# Patient Record
Sex: MALE | Race: WHITE | Employment: FULL TIME | URBAN - METROPOLITAN AREA
[De-identification: names, ages, dates, MRNs, and addresses within clinical notes are randomized per-mention and may not be internally consistent; named-entity substitution may affect disease eponyms.]

---

## 2018-09-27 ENCOUNTER — HOSPITAL ENCOUNTER (EMERGENCY)
Facility: HOSPITAL | Age: 28
Discharge: HOME | End: 2018-09-27
Attending: EMERGENCY MEDICINE
Payer: OTHER MISCELLANEOUS

## 2018-09-27 VITALS
HEIGHT: 74 IN | OXYGEN SATURATION: 100 % | RESPIRATION RATE: 18 BRPM | DIASTOLIC BLOOD PRESSURE: 80 MMHG | HEART RATE: 76 BPM | SYSTOLIC BLOOD PRESSURE: 151 MMHG | BODY MASS INDEX: 25.67 KG/M2 | TEMPERATURE: 98 F | WEIGHT: 200 LBS

## 2018-09-27 DIAGNOSIS — S39.012A STRAIN OF LUMBAR REGION, INITIAL ENCOUNTER: Primary | ICD-10-CM

## 2018-09-27 PROCEDURE — 99283 EMERGENCY DEPT VISIT LOW MDM: CPT

## 2018-09-27 RX ORDER — HYDROCODONE BITARTRATE AND ACETAMINOPHEN 5; 325 MG/1; MG/1
1 TABLET ORAL ONCE
Status: COMPLETED | OUTPATIENT
Start: 2018-09-27 | End: 2018-09-27

## 2018-09-27 RX ORDER — HYDROCODONE BITARTRATE AND ACETAMINOPHEN 5; 325 MG/1; MG/1
1-2 TABLET ORAL EVERY 4 HOURS PRN
Qty: 10 TABLET | Refills: 0 | Status: SHIPPED | OUTPATIENT
Start: 2018-09-27 | End: 2018-10-04

## 2018-09-27 NOTE — ED PROVIDER NOTES
Patient Seen in: [facility] Emergency Department    History   Patient presents with:  Back Pain (musculoskeletal)    Stated Complaint:     HPI    Patient is a 41-year-old male who presents with low back pain started last night after lifting someth tyesha        ED Course   Labs Reviewed - No data to display    MDM   Discussed with patient that he likely pulled the muscle and recommend pain medicine and local therapy as well as exercises for home. Provided with work note and pain meds.   Patient's fa

## 2018-09-27 NOTE — ED INITIAL ASSESSMENT (HPI)
Pt states he injured lower back yesterday lifting heavy items at work. States pain worsened with movement. Ambulatory in triage.

## 2024-08-16 ENCOUNTER — HOSPITAL ENCOUNTER (OUTPATIENT)
Age: 34
Discharge: HOME | End: 2024-08-16
Payer: MEDICAID

## 2024-08-16 ENCOUNTER — TELEPHONE (OUTPATIENT)
Dept: FAMILY MEDICINE CLINIC | Facility: CLINIC | Age: 34
End: 2024-08-16

## 2024-08-16 VITALS
TEMPERATURE: 98 F | SYSTOLIC BLOOD PRESSURE: 123 MMHG | DIASTOLIC BLOOD PRESSURE: 83 MMHG | HEART RATE: 72 BPM | OXYGEN SATURATION: 96 % | RESPIRATION RATE: 18 BRPM

## 2024-08-16 DIAGNOSIS — M54.40 BACK PAIN OF LUMBAR REGION WITH SCIATICA: Primary | ICD-10-CM

## 2024-08-16 RX ORDER — CYCLOBENZAPRINE HCL 10 MG
10 TABLET ORAL 3 TIMES DAILY PRN
Qty: 20 TABLET | Refills: 0 | Status: SHIPPED | OUTPATIENT
Start: 2024-08-16 | End: 2024-08-19

## 2024-08-16 RX ORDER — PREDNISONE 20 MG/1
40 TABLET ORAL DAILY
Qty: 10 TABLET | Refills: 0 | Status: SHIPPED | OUTPATIENT
Start: 2024-08-16 | End: 2024-08-21

## 2024-08-16 NOTE — DISCHARGE INSTRUCTIONS
You likely have sciatica and muscle strain of your back.    I sent you a prescription for prednisone to help with the inflammation and pain.  Use the Flexeril for severe pain, remember this will make you drowsy so do not drive or drink alcohol when you take it.    Use lidocaine patches as discussed for pain control, you can buy them over-the-counter at the 4% strength.    If you develop any numbness, tingling, acutely worsening pain, urinary or bowel incontinence or any other concerning complaints you should go to the emergency department.  If you have persistent pain for more than the next week make an appointment to see the physical medicine specialist.  Otherwise follow-up with your primary care doctor.

## 2024-08-16 NOTE — TELEPHONE ENCOUNTER
Reason for Call/Symptoms: Lower back pain that radiates down left leg    Onset: End of 7/2024    Courtesy Assessment: Patient called states he was going to sit in his car and started to having muscle spasms of his lower back and left leg and fell out of his car and had a lot of nerve pain. He thinks he may have bugled disc of his lower back. He is in pain now --> 7/10, improved since onset when he was 10/10. He is able to walk, but shorter distances and has been limping. He has had some right hand tingling since onset. Denies any dragging of foot or numbness of foot. He did not go to Emergency Department or Immediate Care for the incident as he did not have any medical coverage. He was advised to go to [facility] today--> agreeable to [facility]. He also scheduled a new patient appointment with Dr. Jo Monday. Home Care Advice discussed, per protocol. Patient instructed any new or worsening symptoms [reviewed] seek immediate medical attention--> Emergency Department/911. Patient verbalized understanding. No further questions or concerns at this time.    Level of care recommendation: Immediate Care    Advice given to patient: Immediate Care advised with new patient visit post Immediate Care. Home Care Advice discussed, per protocol. [Below]    USE A COLD PACK FOR PAIN:  * Put a cold pack or an ice bag (wrapped in a moist towel) on the area for 20 minutes.  * Repeat in 1 hour, then every 4 hours while awake.  * Continue this for the first 48 hours (2 days).  * This will help decrease pain.  * Caution: Avoid frostbite.    12: Back Pain From Lifting or Twisting:   USE HEAT AFTER 48 HOURS FOR PAIN:  * If pain lasts over 48 hours (2 days), put heat on the sore area.  * Use a heat pack, heating pad, or warm wet washcloth.  * Do this for 10 minutes three times a day.  * This will help increase blood flow and improve healing.  * Caution: Avoid burn. Do not sleep on a heating pad.   Back Pain From  Lifting or Twisting:   SLEEP:  * Sleep on your side with a pillow between your knees.  * If you sleep on your back, place a pillow under your knees.  * Avoid sleeping on the abdomen. The mattress should be firm or reinforced with a board.  * Avoid waterbeds.   Back Pain From Lifting or Twisting:   ACTIVITY:  * Continue normal activities as much as your pain allows. Staying active is more healing for the back than rest.  * Avoid any activities that increase the pain a lot.  * Avoid heavy lifting and strenuous exercise until completely well.  * Complete bed rest is not needed and can make you feel more stiff.   Back Pain From Lifting or Twisting:   PAIN MEDICINES:  * For pain relief, you can take either acetaminophen, ibuprofen, or naproxen.  * They are over-the-counter (OTC) pain drugs. You can buy them at the drugstore.  * ACETAMINOPHEN - REGULAR STRENGTH TYLENOL: Take 650 mg (two 325 mg pills) by mouth every 4 to 6 hours as needed. Each Regular Strength Tylenol pill has 325 mg of acetaminophen. The most you should take is 10 pills a day (3,250 mg total). Note: In [place], the maximum is 12 pills a day (3,900 mg total).  * ACETAMINOPHEN - EXTRA STRENGTH TYLENOL: Take 1,000 mg (two 500 mg pills) every 6 to 8 hours as needed. Each Extra Strength Tylenol pill has 500 mg of acetaminophen. The most you should take is 6 pills a day (3,000 mg total). Note: In [place], the maximum is 8 pills a day (4,000 mg total).  * IBUPROFEN (E.G., MOTRIN, ADVIL): Take 400 mg (two 200 mg pills) by mouth every 6 hours. The most you should take is 6 pills a day (1,200 mg total).  * NAPROXEN (E.G., ALEVE): Take 220 mg (one 220 mg pill) by mouth every 8 to 12 hours as needed. You may take 440 mg (two 220 mg pills) for your first dose. The most you should take is 3 pills a day (660 mg total). Note: In [place], the maximum is 2 pills a day (one every 12 hours; 440 mg total).  * Use the lowest amount of medicine that makes your pain better.      Future Appointments   Date Time Provider Department Center   8/19/2024  2:00 PM Reggie Jo MD [facility]

## 2024-08-16 NOTE — ED INITIAL ASSESSMENT (HPI)
Pt c/o pain from left lower back that radiates to his left leg for couple of weeks, denies injury

## 2024-08-17 NOTE — ED PROVIDER NOTES
Patient Seen in: [facility]      History     Chief Complaint   Patient presents with    Pain     Stated Complaint: BACK PAIN  Subjective:   Sanket is a 33-year-old male presenting to the immediate care complaining of back pain.  Patient states that the pain has been going on for the past couple of weeks.  States that there was no acute change in the pain today but he finally had time to come in for evaluation.  States that he does have some radiation of the pain down his left leg.  Patient does have a history of sciatica and this feels similar to previous sciatica symptoms.  Patient denies any weakness, numbness, tingling to his legs.  No saddle anesthesia.  No urinary or bowel incontinence.  Patient denies any fall, injury or trauma.  Denies any urinary symptoms.  Denies any abdominal pain.  Patient has no other complaints or concerns.        Objective:   No pertinent past medical history.          No pertinent past surgical history.            No pertinent social history.          Review of Systems    Positive for stated complaint: Pain    Other systems are as noted in HPI.  Constitutional and vital signs reviewed.      All other systems reviewed and negative except as noted above.    Physical Exam     ED Triage Vitals [08/16/24 1813]   /83   Pulse 72   Resp 18   Temp 97.8 °F (36.6 °C)   Temp src Temporal   SpO2 96 %   O2 Device None (Room air)     Current:/83   Pulse 72   Temp 97.8 °F (36.6 °C) (Temporal)   Resp 18   SpO2 96%     Physical Exam  Vitals and nursing note reviewed.   Constitutional:       General: He is not in acute distress.     Appearance: Normal appearance. He is not ill-appearing, toxic-appearing or diaphoretic.   HENT:      Head: Normocephalic.   Cardiovascular:      Rate and Rhythm: Normal rate.   Pulmonary:      Effort: Pulmonary effort is normal.   Musculoskeletal:         General: Normal range of motion.      Cervical back: Normal and normal range of motion.       Thoracic back: Normal.      Lumbar back: Normal.      Comments: Cervical, thoracic, lumbar and sacral spine were palpated and were without any tenderness, crepitus or step-off.  Patient has paraspinal pain to the left lower lumbar area.  Patient has positive left straight leg raise. 5/5 strength with flexion and extension of bilateral lower extremities.        Skin:     General: Skin is warm and dry.      Capillary Refill: Capillary refill takes less than 2 seconds.   Neurological:      General: No focal deficit present.      Mental Status: He is alert and oriented to person, place, and time.   Psychiatric:         Mood and Affect: Mood normal.         Behavior: Behavior normal.         Thought Content: Thought content normal.         Judgment: Judgment normal.         ED Course   Radiology:  No results found.  Labs Reviewed - No data to display    MDM     Medical Decision Making  Differential diagnoses reflecting the complexity of care include but are not limited to sciatica, muscle strain, less likely acute pain problem.    Comorbidities that add complexity to management include: None  History obtained by an independent source was from: Patient  Patient is well appearing, non-toxic and in no acute distress.  Vital signs are stable.   There is no bony tenderness on spine exam so there is minimal utility in x-rays.  History and physical exam are consistent with muscle strain/sciatica.  Sent a prescription for prednisone for sciatica pain.  Sent prescription for Flexeril for severe pain with drowsiness precautions. Recommended lidocaine patches for pain control. Recommended if patient develops any numbness, tingling, acutely worsening pain, urinary or bowel incontinence or any other concerning complaints they should go to the emergency department.  Recommended if patient has persistent pain for more than the next week they make an appointment to see the physical medicine specialist.  Otherwise recommended follow-up  with primary care doctor.  ED precautions discussed.  Patient (guardian) advised to follow up with PCP in 2-3 days.  Patient (guardian) agrees with this plan of care.  Patient (guardian) verbalizes understanding of discharge instructions and plan of care.      Risk  OTC drugs.  Prescription drug management.        Disposition and Plan     Clinical Impression:  1. Back pain of lumbar region with sciatica         Disposition:  Discharge  8/16/2024  6:50 pm    Follow-up:  Jayro Ge MD  [address] [place] [id number] [phone]          Andreas Cardoza MD  [address] [place] [id number]  [phone]                Medications Prescribed:  Discharge Medication List as of 8/16/2024  6:56 PM        START taking these medications    Details   predniSONE 20 MG Oral Tab Take 2 tablets (40 mg total) by mouth daily for 5 days., Normal, Disp-10 tablet, R-0      cyclobenzaprine 10 MG Oral Tab Take 1 tablet (10 mg total) by mouth 3 (three) times daily as needed for Muscle spasms., Normal, Disp-20 tablet, R-0

## 2024-08-19 ENCOUNTER — OFFICE VISIT (OUTPATIENT)
Facility: LOCATION | Age: 34
End: 2024-08-19

## 2024-08-19 VITALS
SYSTOLIC BLOOD PRESSURE: 122 MMHG | HEIGHT: 74 IN | HEART RATE: 87 BPM | DIASTOLIC BLOOD PRESSURE: 79 MMHG | OXYGEN SATURATION: 97 % | TEMPERATURE: 98 F | BODY MASS INDEX: 26 KG/M2 | RESPIRATION RATE: 18 BRPM

## 2024-08-19 DIAGNOSIS — M48.062 NEUROGENIC CLAUDICATION DUE TO LUMBAR SPINAL STENOSIS: Primary | ICD-10-CM

## 2024-08-19 PROCEDURE — 99204 OFFICE O/P NEW MOD 45 MIN: CPT | Performed by: INTERNAL MEDICINE

## 2024-08-19 RX ORDER — PREDNISONE 10 MG/1
TABLET ORAL
Qty: 18 TABLET | Refills: 0 | Status: SHIPPED | OUTPATIENT
Start: 2024-08-19 | End: 2024-08-28

## 2024-08-19 RX ORDER — CYCLOBENZAPRINE HCL 10 MG
TABLET ORAL 3 TIMES DAILY PRN
Qty: 90 TABLET | Refills: 3 | Status: SHIPPED | OUTPATIENT
Start: 2024-08-19

## 2024-08-19 NOTE — PROGRESS NOTES
INTERNAL MEDICINE OFFICE NOTE     Patient ID: Sanket Sweeney is a 33 year old male.  Today's Date: 08/19/24  Chief Complaint: Low Back Pain (Lower back and left leg for about 2 weeks. Pain at about a 7)    Low Back Pain      Pleasant 31-year-old gentleman who presents for back pain.  He states this been going on for about 2 weeks, no obvious injury but he does have repetitive activities as.  Does have a history of this starting in the past.  States that it starts in his left side and radiates down his back.  Went to urgent care, notes reviewed and appreciated, he was given some prednisone and muscle relaxer and he has noted some improvement.  He has not noticed any resolution.        Vitals:    08/19/24 1407   BP: 122/79   Pulse: 87   Resp: 18   Temp: 98.1 °F (36.7 °C)   SpO2: 97%   Height: 6' 2\" (1.88 m)     body mass index is 25.68 kg/m².  BP Readings from Last 3 Encounters:   08/19/24 122/79   08/16/24 123/83   09/27/18 151/80     The ASCVD Risk score (Maureen LANGSTON, et al., 2019) failed to calculate for the following reasons:    The 2019 ASCVD risk score is only valid for ages 40 to 79  Medications reviewed:  Current Outpatient Medications   Medication Sig Dispense Refill    predniSONE 10 MG Oral Tab Take 3 tablets (30 mg total) by mouth daily for 3 days, THEN 2 tablets (20 mg total) daily for 3 days, THEN 1 tablet (10 mg total) daily for 3 days. TAKE WITH FOOD IF BACK PAIN WORSENS.. 18 tablet 0    cyclobenzaprine 10 MG Oral Tab Take 0.5-1 tablets (5-10 mg total) by mouth 3 (three) times daily as needed for Muscle spasms. May cause sedation; no driving. 90 tablet 3    predniSONE 20 MG Oral Tab Take 2 tablets (40 mg total) by mouth daily for 5 days. 10 tablet 0    NASACORT AQ 55 MCG/ACT NA AERS 2 SPRAY EACH NOSTRIL BID FOR 1 WEEK AND THEN QD 1 11    FLONASE 50 MCG/ACT NA SUSP 2 sprays each nostril BID x 1week, then qd 1 11         Assessment & Plan    1. Neurogenic claudication due to lumbar spinal  stenosis (Primary)  -     predniSONE; Take 3 tablets (30 mg total) by mouth daily for 3 days, THEN 2 tablets (20 mg total) daily for 3 days, THEN 1 tablet (10 mg total) daily for 3 days. TAKE WITH FOOD IF BACK PAIN WORSENS..  Dispense: 18 tablet; Refill: 0  -     Cyclobenzaprine HCl; Take 0.5-1 tablets (5-10 mg total) by mouth 3 (three) times daily as needed for Muscle spasms. May cause sedation; no driving.  Dispense: 90 tablet; Refill: 3  -     PHYSICAL THERAPY - INTERNAL  -     MRI SPINE LUMBAR (CPT=72148); Future; Expected date: 08/19/2024  -     Physiatry Referral - In Network  Plan  Given the radicular symptoms we will give him a tapering dose of prednisone, continue Flexeril, advise no chiropractic manipulation, I like him to do some heat and massage physical therapy and I would like to get an MRI of the lumbar spine given the recurrent nature of his symptoms, I suspect there may be some foraminal stenosis going on.    Follow Up: Return in about 4 weeks (around 9/16/2024) for ANNUAL EXAM..         Objective: Results:   Physical Exam  Vitals and nursing note reviewed.   Constitutional:       General: He is not in acute distress.     Appearance: Normal appearance.   HENT:      Head: Normocephalic.      Right Ear: External ear normal.      Left Ear: External ear normal.   Eyes:      Extraocular Movements: Extraocular movements intact.      Conjunctiva/sclera: Conjunctivae normal.   Pulmonary:      Effort: Pulmonary effort is normal.   Musculoskeletal:      Cervical back: Normal range of motion and neck supple.      Lumbar back: Spasms and tenderness present. Decreased range of motion. Positive left straight leg raise test.   Skin:     Coloration: Skin is not jaundiced.   Neurological:      General: No focal deficit present.      Mental Status: He is alert and oriented to person, place, and time. Mental status is at baseline.   Psychiatric:         Mood and Affect: Mood normal.         Behavior: Behavior normal.         Reviewed:    There are no problems to display for this patient.     No Known Allergies     Social History     Socioeconomic History    Marital status: Single   Tobacco Use    Smoking status: Every Day     Types: Cigarettes    Smokeless tobacco: Never   Social History Narrative    ** Merged History Encounter **           Review of Systems  All other systems negative unless otherwise stated in ROS or HPI above.       Reggie Jo MD  Internal Medicine       Call office with any questions or seek emergency care if necessary.   Patient understands and agrees to follow directions and advice.      ----------------------------------------- PATIENT INSTRUCTIONS-----------------------------------------   .    Patient Instructions         Your symptoms are consistent with lower back pain and/ or muscle spasm.   This should resolve on it's own with time (can take 6-8 weeks) but below are some recommendations to speed your recovery:   Take 1-2 tablets of naproxen, which is the same as Aleve, twice daily with food for 5-7 days to reduce inflammation and pain. Do not use these medications if you have a kidney condition. You may take an over the counter proton-pump-inhibitor such as Nexium or the generic \"omeprazole\" (ask your pharmacist for alternative and assistance) as directed with naproxen/Aleve if you have acid reflux/heartburn or develop an upset stomach.   If you have been prescribed a muscle relaxant such as flexeril, take as directed to help reduce muscle spasms and pain. The maximum dose is 10mg however any dose of this medication can make you extremely drowsy, no driving.    You may use exercises and stretches as discussed below, or check out the [employer]: [employer].  Use heat, stretches, and therapies as outline below.   You can use over the counter medications like lidocaine patches, Salonpas, BenGay, IcyHot as directed.  Please do not see a chiropractor for spine  manipulation (ie traction) as this can result in permanent damage to your blood vessels or nerves. Acupuncture and massage therapy are fine however.   If you fail to improve or worsen despite performing the above for 2-3 weeks, please return to the office for re-evaluation. We can discuss further imaging and evaluation by our specialists.    Back Pain (Acute or Chronic)  3  Back pain is one of the most common problems. The good news is that most people feel better in 1 to 2 weeks, and most of the rest in 1 to 2 months. Most people can remain active.  People who have pain describe it differently--not everyone is the same.  The pain can be sharp, stabbing, shooting, aching, cramping or burning.  Movement, standing, bending, lifting, sitting, or walking may worsen pain.  It can be localized to one spot or area, or it can be more generalized.  It can spread or radiate upwards, to the front, or go down your arms or legs (sciatica).  It can cause muscle spasm.  Most of the time, mechanical problems with the muscles or spine cause the pain. Mechanical problems are usually caused by an injury to the muscles or ligaments. While illness can cause back pain, it is usually not caused by a serious illness. Mechanical problems include:   Physical activity such as sports, exercise, work, or normal activity  Overexertion, lifting, pushing, pulling incorrectly or too aggressively  Sudden twisting, bending, or stretching from an accident, or accidental movement  Poor posture  Stretching or moving wrong, without noticing pain at the time  Poor coordination, lack of regular exercise (check with your doctor about this)  Spinal disc disease or arthritis  Stress  Pain can also be related to pregnancy, or illness like appendicitis, bladder or kidney infections, pelvic infections, and many other things.  Acute back pain usually gets better in 1 to 2 weeks. Back pain related to disk disease, arthritis in the spinal joints or spinal stenosis  (narrowing of the spinal canal) can become chronic and last for months or years.  Unless you had a physical injury (for example, a car accident or fall) X-rays are usually not needed for the initial evaluation of back pain. If pain continues and does not respond to medical treatment, X-rays and other tests may be needed.  Home care  Try these home care recommendations:  When in bed, try to find a position of comfort. A firm mattress is best. Try lying flat on your back with pillows under your knees. You can also try lying on your side with your knees bent up towards your chest and a pillow between your knees.  At first, do not try to stretch out the sore spots. If there is a strain, it is not like the good soreness you get after exercising without an injury. In this case, stretching may make it worse.  Don't sit for long periods, as in a long car ride or during other travel. This puts more stress on the lower back than standing or walking.  During the first 24 to 72 hours after an acute injury or flare up of chronic back pain, apply an ice pack to the painful area for 20 minutes and then remove it for 20 minutes. Do this over a period of 60 to 90 minutes or several times a day. This will reduce swelling and pain. Wrap the ice pack in a thin towel or plastic to protect your skin.  You can start with ice, then switch to heat. Heat (hot shower, hot bath, or heating pad) reduces pain and works well for muscle spasms. Heat can be applied to the painful area for 20 minutes then remove it for 20 minutes. Do this over a period of 60 to 90 minutes or several times a day. Do not sleep on a heating pad. It can lead to skin burns or tissue damage.  You can alternate ice and heat therapy. Talk with your doctor about the best treatment for your back pain.  Therapeutic massage can help relax the back muscles without stretching them.  Be aware of safe lifting methods and do not lift anything without stretching  first.  Medicines  Talk to your doctor before using medicine, especially if you have other medical problems or are taking other medicines.  You may use over-the-counter medicine as directed on the bottle to control pain, unless another pain medicine was prescribed. If you have chronic conditions like diabetes, liver or kidney disease, stomach ulcers, or gastrointestinal bleeding, or are taking blood thinners, talk to your doctor before taking any medicine.  Be careful if you are given a prescription medicines, narcotics, or medicine for muscle spasms. They can cause drowsiness, affect your coordination, reflexes, and judgement. Do not drive or operate heavy machinery.  Follow-up care  Follow up with your healthcare provider, or as advised.   A radiologist will review any X-rays that were taken. Your provide will notify you of any new findings that may affect your care.  Call 911  Call 911 if any of the following occur:  Trouble breathing  Confusion  Very drowsy or trouble awakening  Fainting or loss of consciousness  Rapid or very slow heart rate  Loss of bowel or bladder control  When to seek medical advice  Call your healthcare provider right away if any of these occur:   Pain becomes worse or spreads to your legs  Weakness or numbness in one or both legs  Numbness in the groin or genital area  Date Last Reviewed: 7/1/2016  © 2094-6114 [employer]. [address] [place] [address], [place] [address]. All rights reserved. This information is not intended as a substitute for professional medical care. Always follow your healthcare professional's instructions.    Self-Care for Low Back Pain    Most people have low back pain now and then. In many cases, it isn’t serious and self-care can help. Sometimes low back pain can be a sign of a bigger problem. Call your healthcare provider if your pain returns often or gets worse over time. For the long-term care of your back, get regular exercise, lose any excess weight  and learn good posture.  Take a short rest  Lying down during the day may be helpful for short periods of time if severe pain increases with sitting or standing. Long-term bed rest could be damaging.  Reduce pain and swelling  Cold reduces swelling. Both cold and heat can reduce pain. Protect your skin by placing a towel between your body and the ice or heat source.  For the first few days, apply an ice pack for 15 to 20 minutes, several times a day. To make a cold pack, put ice cubes in a plastic bag that seals at the top.  After the first few days, try heat for 15 minutes at a time to ease pain. Never sleep on a heating pad.  Over-the-counter medicine can help control pain and swelling. Try aspirin or a non-steroidal anti-inflammatory medicine (NSAID) such as ibuprofen.  Exercise  Exercise can help your back heal. It also helps your back get stronger and more flexible, preventing any reinjury. Ask your healthcare provider about specific exercises for your back.  Use good posture to avoid reinjury  When moving, bend at the hips and knees. Don’t bend at the waist or twist around.  When lifting, keep the object close to your body. Lift heavy items using your legs, not your back. Don’t try to lift more than you can handle.  When sitting, keep your lower back supported. Use a rolled-up towel as needed.     Seek medical care right away if:  You can't stand or walk.  You have a temperature over 100.4°F (38.0°C)  You have frequent, painful, or bloody urination.  You have severe abdominal pain.  You have a sharp, stabbing pain.  Your pain is constant.  You have pain, tingling, or numbness in your leg.  You feel pain in a new area of your back.  You notice that the pain isn’t decreasing after more than a week.  Date Last Reviewed: 3/1/2018  © 4411-6306 [employer]. [address] [facility], [place] [address]. All rights reserved. This information is not intended as a substitute for professional medical care.  Always follow your healthcare professional's instructions.    Relieving Back Pain  Back pain is a common problem. You can strain back muscles by lifting too much weight or just by moving the wrong way. Back strain can be uncomfortable, even painful. And it can take weeks or months to improve. To help yourself feel better and prevent future back strains, try these tips.  Important: Don't give aspirin to children or teens without first discussing it with your child's healthcare provider.  Ice    Ice reduces muscle pain and swelling. It helps most during the first 24 to 48 hours after an injury.  Wrap an ice pack or a bag of frozen peas in a thin towel. Never put ice directly on your skin.  Place the ice where your back hurts the most.  Don’t ice for more than 20 minutes at a time.  You can use ice several times a day.  Medicines  Over-the-counter pain relievers include acetaminophen and anti-inflammatory medicines, which includes aspirin, naproxen, or ibuprofen. They can help ease discomfort. Some also reduce swelling.  Tell your healthcare provider about any medicines you are already taking.  Take medicines only as directed.  Manipulation and massage  Having manipulation by an osteopathic doctor or chiropractor may be helpful. Getting a massage also may help.   Heat  After the first 48 hours, heat can relax sore muscles and improve blood flow.  Try a warm bath or shower. Or use a heating pad set on low. To prevent a burn, keep a cloth between you and the heating pad.  Don’t use a heating pad for more than 15 minutes at a time. Never sleep on a heating pad.  Date Last Reviewed: 6/1/2018  © 5755-9264 [employer]. [address] [place] [address], [place] [address]. All rights reserved. This information is not intended as a substitute for professional medical care. Always follow your healthcare professional's instructions.    Back Spasm (No Trauma)  Spasm of the back muscles can occur after a sudden forceful  twisting or bending such as in a car accident. A spasm can also happen after a simple awkward movement, or after lifting something heavy with poor body positioning. In any case, muscle spasm adds to the pain. Sleeping in an awkward position or on a poor quality mattress can also cause this. Some people respond to emotional stress by tensing the muscles of their back.  Pain that continues may need further assessment or other types of treatment such as physical therapy.  You don't always need X-rays for the first assessment of back pain, unless you had a physical injury such as from a car accident or fall. If your pain continues and doesn't respond to medical treatment, X-rays and other tests may then be done.   Home care  As soon as possible, start sitting or walking again. This will help prevent problems from a long bed rest. These problems include muscle weakness, worsening back stiffness and pain, and blood clots in the legs.  When in bed, try to find a position of comfort. A firm mattress is best. Try lying flat on your back with pillows under your knees. You can also try lying on your side with your knees bent up toward your chest and a pillow between your knees.  Don't sit for long periods. Also limit car rides and travel. This puts more stress on the lower back than standing or walking.   During the first 24 to 72 hours after an injury or flare-up, put an ice pack on the painful area for 20 minutes, then remove it for 20 minutes. Do this over a period of 60 to 90 minutes, or several times a day. This will reduce swelling and pain. Always wrap ice packs in a thin towel.  You can start with ice, then switch to heat. Heat from a hot shower, hot bath, or heating pad reduces pain and works well for muscle spasms. Put heat on the painful area for 20 minutes, then remove it for 20 minutes. Do this over a period of 60 to 90 minutes, or several times a day. Don't sleep on a heating pad. It can burn or damage  skin.  Alternate using ice and heat.  Be aware of safe lifting methods. don't lift anything over 15 pounds until all the pain is gone.  Gentle stretching will help your back heal faster. Do this simple routine 2 to 3 times a day until your back is feeling better.  Lie on your back with your knees bent and both feet on the ground.  Slowly raise your left knee to your chest as you flatten your lower back against the floor. Hold for 20 to 30 seconds.  Relax and repeat the exercise with your right knee.  Do 2 to 3 of these exercises for each leg.  Repeat, hugging both knees to your chest at the same time.  Don't bounce, but use a gentle pull.  Medicines  Talk with your doctor before using medicine, especially if you have other medical problems or are taking other medicines.  You may use over-the-counter medicines such as acetaminophen, ibuprofen, or naprosyn to control pain, unless your healthcare provider prescribed another pain medicine. Talk with your healthcare provider if you have a chronic condition such as diabetes, liver or kidney disease, stomach ulcer, or digestive bleeding, or are taking blood thinners.  Be careful if you are given prescription pain medicine, opioids, or medicine for muscle spasm. They can cause drowsiness, and affect your coordination, reflexes, and judgment. Don't drive or operate heavy machinery when taking these medicines. Take pain medicine only as prescribed by your healthcare provider.  Follow-up care  Follow up with your doctor, or as advised. You may need physical therapy or more tests.  If X-rays were taken, they may be reviewed by a radiologist. You will be told of any new findings that may affect your care.  Call   Call if any of these occur:  Trouble breathing  Confusion  Drowsiness or trouble awakening  Fainting or loss of consciousness  Rapid or very slow heart rate  Loss of bowel or bladder control  When to seek medical advice  Call your healthcare provider right away if any  of these occur:  Pain becomes worse or spreads to your legs  Weakness or numbness in one or both legs  Numbness in the groin or genital area  Fever of 100.4ºF (38ºC) or higher , or as directed by your healthcare provider  Chills  Burning or pain when passing urine  Date Last Reviewed: 11/1/2018  © 9031-8470 [employer]. [address] [place] [address]. All rights reserved. This information is not intended as a substitute for professional medical care. Always follow your healthcare professional's instructions.

## 2024-08-19 NOTE — PATIENT INSTRUCTIONS
Your symptoms are consistent with lower back pain and/ or muscle spasm.   This should resolve on it's own with time (can take 6-8 weeks) but below are some recommendations to speed your recovery:   Take 1-2 tablets of naproxen, which is the same as Aleve, twice daily with food for 5-7 days to reduce inflammation and pain. Do not use these medications if you have a kidney condition. You may take an over the counter proton-pump-inhibitor such as Nexium or the generic \"omeprazole\" (ask your pharmacist for alternative and assistance) as directed with naproxen/Aleve if you have acid reflux/heartburn or develop an upset stomach.   If you have been prescribed a muscle relaxant such as flexeril, take as directed to help reduce muscle spasms and pain. The maximum dose is 10mg however any dose of this medication can make you extremely drowsy, no driving.    You may use exercises and stretches as discussed below, or check out the [employer]: [employer].  Use heat, stretches, and therapies as outline below.   You can use over the counter medications like lidocaine patches, Salonpas, BenGay, IcyHot as directed.  Please do not see a chiropractor for spine manipulation (ie traction) as this can result in permanent damage to your blood vessels or nerves. Acupuncture and massage therapy are fine however.   If you fail to improve or worsen despite performing the above for 2-3 weeks, please return to the office for re-evaluation. We can discuss further imaging and evaluation by our specialists.    Back Pain (Acute or Chronic)  3  Back pain is one of the most common problems. The good news is that most people feel better in 1 to 2 weeks, and most of the rest in 1 to 2 months. Most people can remain active.  People who have pain describe it differently--not everyone is the same.  The pain can be sharp, stabbing, shooting, aching, cramping or burning.  Movement, standing, bending, lifting, sitting,  or walking may worsen pain.  It can be localized to one spot or area, or it can be more generalized.  It can spread or radiate upwards, to the front, or go down your arms or legs (sciatica).  It can cause muscle spasm.  Most of the time, mechanical problems with the muscles or spine cause the pain. Mechanical problems are usually caused by an injury to the muscles or ligaments. While illness can cause back pain, it is usually not caused by a serious illness. Mechanical problems include:   Physical activity such as sports, exercise, work, or normal activity  Overexertion, lifting, pushing, pulling incorrectly or too aggressively  Sudden twisting, bending, or stretching from an accident, or accidental movement  Poor posture  Stretching or moving wrong, without noticing pain at the time  Poor coordination, lack of regular exercise (check with your doctor about this)  Spinal disc disease or arthritis  Stress  Pain can also be related to pregnancy, or illness like appendicitis, bladder or kidney infections, pelvic infections, and many other things.  Acute back pain usually gets better in 1 to 2 weeks. Back pain related to disk disease, arthritis in the spinal joints or spinal stenosis (narrowing of the spinal canal) can become chronic and last for months or years.  Unless you had a physical injury (for example, a car accident or fall) X-rays are usually not needed for the initial evaluation of back pain. If pain continues and does not respond to medical treatment, X-rays and other tests may be needed.  Home care  Try these home care recommendations:  When in bed, try to find a position of comfort. A firm mattress is best. Try lying flat on your back with pillows under your knees. You can also try lying on your side with your knees bent up towards your chest and a pillow between your knees.  At first, do not try to stretch out the sore spots. If there is a strain, it is not like the good soreness you get after exercising  without an injury. In this case, stretching may make it worse.  Don't sit for long periods, as in a long car ride or during other travel. This puts more stress on the lower back than standing or walking.  During the first 24 to 72 hours after an acute injury or flare up of chronic back pain, apply an ice pack to the painful area for 20 minutes and then remove it for 20 minutes. Do this over a period of 60 to 90 minutes or several times a day. This will reduce swelling and pain. Wrap the ice pack in a thin towel or plastic to protect your skin.  You can start with ice, then switch to heat. Heat (hot shower, hot bath, or heating pad) reduces pain and works well for muscle spasms. Heat can be applied to the painful area for 20 minutes then remove it for 20 minutes. Do this over a period of 60 to 90 minutes or several times a day. Do not sleep on a heating pad. It can lead to skin burns or tissue damage.  You can alternate ice and heat therapy. Talk with your doctor about the best treatment for your back pain.  Therapeutic massage can help relax the back muscles without stretching them.  Be aware of safe lifting methods and do not lift anything without stretching first.  Medicines  Talk to your doctor before using medicine, especially if you have other medical problems or are taking other medicines.  You may use over-the-counter medicine as directed on the bottle to control pain, unless another pain medicine was prescribed. If you have chronic conditions like diabetes, liver or kidney disease, stomach ulcers, or gastrointestinal bleeding, or are taking blood thinners, talk to your doctor before taking any medicine.  Be careful if you are given a prescription medicines, narcotics, or medicine for muscle spasms. They can cause drowsiness, affect your coordination, reflexes, and judgement. Do not drive or operate heavy machinery.  Follow-up care  Follow up with your healthcare provider, or as advised.   A radiologist will  review any X-rays that were taken. Your provide will notify you of any new findings that may affect your care.  Call 911  Call 911 if any of the following occur:  Trouble breathing  Confusion  Very drowsy or trouble awakening  Fainting or loss of consciousness  Rapid or very slow heart rate  Loss of bowel or bladder control  When to seek medical advice  Call your healthcare provider right away if any of these occur:   Pain becomes worse or spreads to your legs  Weakness or numbness in one or both legs  Numbness in the groin or genital area  Date Last Reviewed: 7/1/2016 © 2000-2019 [employer]. [address] [place] [id number]. All rights reserved. This information is not intended as a substitute for professional medical care. Always follow your healthcare professional's instructions.    Self-Care for Low Back Pain    Most people have low back pain now and then. In many cases, it isn’t serious and self-care can help. Sometimes low back pain can be a sign of a bigger problem. Call your healthcare provider if your pain returns often or gets worse over time. For the long-term care of your back, get regular exercise, lose any excess weight and learn good posture.  Take a short rest  Lying down during the day may be helpful for short periods of time if severe pain increases with sitting or standing. Long-term bed rest could be damaging.  Reduce pain and swelling  Cold reduces swelling. Both cold and heat can reduce pain. Protect your skin by placing a towel between your body and the ice or heat source.  For the first few days, apply an ice pack for 15 to 20 minutes, several times a day. To make a cold pack, put ice cubes in a plastic bag that seals at the top.  After the first few days, try heat for 15 minutes at a time to ease pain. Never sleep on a heating pad.  Over-the-counter medicine can help control pain and swelling. Try aspirin or a non-steroidal anti-inflammatory medicine (NSAID) such as  ibuprofen.  Exercise  Exercise can help your back heal. It also helps your back get stronger and more flexible, preventing any reinjury. Ask your healthcare provider about specific exercises for your back.  Use good posture to avoid reinjury  When moving, bend at the hips and knees. Don’t bend at the waist or twist around.  When lifting, keep the object close to your body. Lift heavy items using your legs, not your back. Don’t try to lift more than you can handle.  When sitting, keep your lower back supported. Use a rolled-up towel as needed.     Seek medical care right away if:  You can't stand or walk.  You have a temperature over 100.4°F (38.0°C)  You have frequent, painful, or bloody urination.  You have severe abdominal pain.  You have a sharp, stabbing pain.  Your pain is constant.  You have pain, tingling, or numbness in your leg.  You feel pain in a new area of your back.  You notice that the pain isn’t decreasing after more than a week.  Date Last Reviewed: 3/1/2018  © 9100-2293 [employer]. [address] [place] [address]. All rights reserved. This information is not intended as a substitute for professional medical care. Always follow your healthcare professional's instructions.    Relieving Back Pain  Back pain is a common problem. You can strain back muscles by lifting too much weight or just by moving the wrong way. Back strain can be uncomfortable, even painful. And it can take weeks or months to improve. To help yourself feel better and prevent future back strains, try these tips.  Important: Don't give aspirin to children or teens without first discussing it with your child's healthcare provider.  Ice    Ice reduces muscle pain and swelling. It helps most during the first 24 to 48 hours after an injury.  Wrap an ice pack or a bag of frozen peas in a thin towel. Never put ice directly on your skin.  Place the ice where your back hurts the most.  Don’t ice for more than 20  minutes at a time.  You can use ice several times a day.  Medicines  Over-the-counter pain relievers include acetaminophen and anti-inflammatory medicines, which includes aspirin, naproxen, or ibuprofen. They can help ease discomfort. Some also reduce swelling.  Tell your healthcare provider about any medicines you are already taking.  Take medicines only as directed.  Manipulation and massage  Having manipulation by an osteopathic doctor or chiropractor may be helpful. Getting a massage also may help.   Heat  After the first 48 hours, heat can relax sore muscles and improve blood flow.  Try a warm bath or shower. Or use a heating pad set on low. To prevent a burn, keep a cloth between you and the heating pad.  Don’t use a heating pad for more than 15 minutes at a time. Never sleep on a heating pad.  Date Last Reviewed: 6/1/2018  © 8372-9344 [employer]. [address] [place] [address]. All rights reserved. This information is not intended as a substitute for professional medical care. Always follow your healthcare professional's instructions.    Back Spasm (No Trauma)  Spasm of the back muscles can occur after a sudden forceful twisting or bending such as in a car accident. A spasm can also happen after a simple awkward movement, or after lifting something heavy with poor body positioning. In any case, muscle spasm adds to the pain. Sleeping in an awkward position or on a poor quality mattress can also cause this. Some people respond to emotional stress by tensing the muscles of their back.  Pain that continues may need further assessment or other types of treatment such as physical therapy.  You don't always need X-rays for the first assessment of back pain, unless you had a physical injury such as from a car accident or fall. If your pain continues and doesn't respond to medical treatment, X-rays and other tests may then be done.   Home care  As soon as possible, start sitting or walking  again. This will help prevent problems from a long bed rest. These problems include muscle weakness, worsening back stiffness and pain, and blood clots in the legs.  When in bed, try to find a position of comfort. A firm mattress is best. Try lying flat on your back with pillows under your knees. You can also try lying on your side with your knees bent up toward your chest and a pillow between your knees.  Don't sit for long periods. Also limit car rides and travel. This puts more stress on the lower back than standing or walking.   During the first 24 to 72 hours after an injury or flare-up, put an ice pack on the painful area for 20 minutes, then remove it for 20 minutes. Do this over a period of 60 to 90 minutes, or several times a day. This will reduce swelling and pain. Always wrap ice packs in a thin towel.  You can start with ice, then switch to heat. Heat from a hot shower, hot bath, or heating pad reduces pain and works well for muscle spasms. Put heat on the painful area for 20 minutes, then remove it for 20 minutes. Do this over a period of 60 to 90 minutes, or several times a day. Don't sleep on a heating pad. It can burn or damage skin.  Alternate using ice and heat.  Be aware of safe lifting methods. don't lift anything over 15 pounds until all the pain is gone.  Gentle stretching will help your back heal faster. Do this simple routine 2 to 3 times a day until your back is feeling better.  Lie on your back with your knees bent and both feet on the ground.  Slowly raise your left knee to your chest as you flatten your lower back against the floor. Hold for 20 to 30 seconds.  Relax and repeat the exercise with your right knee.  Do 2 to 3 of these exercises for each leg.  Repeat, hugging both knees to your chest at the same time.  Don't bounce, but use a gentle pull.  Medicines  Talk with your doctor before using medicine, especially if you have other medical problems or are taking other medicines.  You  may use over-the-counter medicines such as acetaminophen, ibuprofen, or naprosyn to control pain, unless your healthcare provider prescribed another pain medicine. Talk with your healthcare provider if you have a chronic condition such as diabetes, liver or kidney disease, stomach ulcer, or digestive bleeding, or are taking blood thinners.  Be careful if you are given prescription pain medicine, opioids, or medicine for muscle spasm. They can cause drowsiness, and affect your coordination, reflexes, and judgment. Don't drive or operate heavy machinery when taking these medicines. Take pain medicine only as prescribed by your healthcare provider.  Follow-up care  Follow up with your doctor, or as advised. You may need physical therapy or more tests.  If X-rays were taken, they may be reviewed by a radiologist. You will be told of any new findings that may affect your care.  Call   Call if any of these occur:  Trouble breathing  Confusion  Drowsiness or trouble awakening  Fainting or loss of consciousness  Rapid or very slow heart rate  Loss of bowel or bladder control  When to seek medical advice  Call your healthcare provider right away if any of these occur:  Pain becomes worse or spreads to your legs  Weakness or numbness in one or both legs  Numbness in the groin or genital area  Fever of 100.4ºF (38ºC) or higher , or as directed by your healthcare provider  Chills  Burning or pain when passing urine  Date Last Reviewed: 11/1/2018  © 6450-4353 [employer]. [address] [place] [id number]. All rights reserved. This information is not intended as a substitute for professional medical care. Always follow your healthcare professional's instructions.

## 2024-09-09 ENCOUNTER — LAB ENCOUNTER (OUTPATIENT)
Dept: LAB | Age: 34
End: 2024-09-09
Attending: INTERNAL MEDICINE
Payer: MEDICAID

## 2024-09-09 ENCOUNTER — OFFICE VISIT (OUTPATIENT)
Facility: LOCATION | Age: 34
End: 2024-09-09

## 2024-09-09 VITALS
OXYGEN SATURATION: 97 % | BODY MASS INDEX: 28.85 KG/M2 | SYSTOLIC BLOOD PRESSURE: 131 MMHG | DIASTOLIC BLOOD PRESSURE: 80 MMHG | HEART RATE: 93 BPM | WEIGHT: 224.81 LBS | HEIGHT: 74 IN

## 2024-09-09 DIAGNOSIS — Z13.228 SCREENING FOR ENDOCRINE, NUTRITIONAL, METABOLIC AND IMMUNITY DISORDER: ICD-10-CM

## 2024-09-09 DIAGNOSIS — E55.9 VITAMIN D DEFICIENCY: ICD-10-CM

## 2024-09-09 DIAGNOSIS — Z11.3 SCREENING FOR STD (SEXUALLY TRANSMITTED DISEASE): ICD-10-CM

## 2024-09-09 DIAGNOSIS — Z13.21 SCREENING FOR ENDOCRINE, NUTRITIONAL, METABOLIC AND IMMUNITY DISORDER: ICD-10-CM

## 2024-09-09 DIAGNOSIS — Z00.00 ANNUAL PHYSICAL EXAM: Primary | ICD-10-CM

## 2024-09-09 DIAGNOSIS — Z13.29 SCREENING FOR ENDOCRINE, NUTRITIONAL, METABOLIC AND IMMUNITY DISORDER: ICD-10-CM

## 2024-09-09 DIAGNOSIS — Z13.0 SCREENING FOR ENDOCRINE, NUTRITIONAL, METABOLIC AND IMMUNITY DISORDER: ICD-10-CM

## 2024-09-09 LAB
ALBUMIN SERPL-MCNC: 4.7 G/DL (ref 3.2–4.8)
ALBUMIN/GLOB SERPL: 1.6 {RATIO} (ref 1–2)
ALP LIVER SERPL-CCNC: 52 U/L
ALT SERPL-CCNC: 114 U/L
ANION GAP SERPL CALC-SCNC: 6 MMOL/L (ref 0–18)
AST SERPL-CCNC: 52 U/L (ref ?–34)
BILIRUB SERPL-MCNC: 1.2 MG/DL (ref 0.3–1.2)
BUN BLD-MCNC: 8 MG/DL (ref 9–23)
BUN/CREAT SERPL: 10.1 (ref 10–20)
CALCIUM BLD-MCNC: 9.9 MG/DL (ref 8.7–10.4)
CHLORIDE SERPL-SCNC: 107 MMOL/L (ref 98–112)
CHOLEST SERPL-MCNC: 193 MG/DL (ref ?–200)
CO2 SERPL-SCNC: 27 MMOL/L (ref 21–32)
CREAT BLD-MCNC: 0.79 MG/DL
DEPRECATED RDW RBC AUTO: 38.5 FL (ref 35.1–46.3)
EGFRCR SERPLBLD CKD-EPI 2021: 120 ML/MIN/1.73M2 (ref 60–?)
ERYTHROCYTE [DISTWIDTH] IN BLOOD BY AUTOMATED COUNT: 11.8 % (ref 11–15)
EST. AVERAGE GLUCOSE BLD GHB EST-MCNC: 100 MG/DL (ref 68–126)
GLOBULIN PLAS-MCNC: 3 G/DL (ref 2–3.5)
GLUCOSE BLD-MCNC: 88 MG/DL (ref 70–99)
HBA1C MFR BLD: 5.1 % (ref ?–5.7)
HCT VFR BLD AUTO: 45.7 %
HDLC SERPL-MCNC: 72 MG/DL (ref 40–59)
HGB BLD-MCNC: 15.8 G/DL
LDLC SERPL CALC-MCNC: 109 MG/DL (ref ?–100)
MCH RBC QN AUTO: 31 PG (ref 26–34)
MCHC RBC AUTO-ENTMCNC: 34.6 G/DL (ref 31–37)
MCV RBC AUTO: 89.6 FL
NONHDLC SERPL-MCNC: 121 MG/DL (ref ?–130)
OSMOLALITY SERPL CALC.SUM OF ELEC: 288 MOSM/KG (ref 275–295)
PLATELET # BLD AUTO: 291 10(3)UL (ref 150–450)
POTASSIUM SERPL-SCNC: 4.3 MMOL/L (ref 3.5–5.1)
PROT SERPL-MCNC: 7.7 G/DL (ref 5.7–8.2)
RBC # BLD AUTO: 5.1 X10(6)UL
SODIUM SERPL-SCNC: 140 MMOL/L (ref 136–145)
TRIGL SERPL-MCNC: 67 MG/DL (ref 30–149)
TSI SER-ACNC: 0.77 MIU/ML (ref 0.55–4.78)
VIT D+METAB SERPL-MCNC: 13.2 NG/ML (ref 30–100)
VLDLC SERPL CALC-MCNC: 11 MG/DL (ref 0–30)
WBC # BLD AUTO: 6.8 X10(3) UL (ref 4–11)

## 2024-09-09 PROCEDURE — 84443 ASSAY THYROID STIM HORMONE: CPT | Performed by: INTERNAL MEDICINE

## 2024-09-09 PROCEDURE — 82306 VITAMIN D 25 HYDROXY: CPT | Performed by: INTERNAL MEDICINE

## 2024-09-09 PROCEDURE — 87491 CHLMYD TRACH DNA AMP PROBE: CPT

## 2024-09-09 PROCEDURE — 36415 COLL VENOUS BLD VENIPUNCTURE: CPT | Performed by: INTERNAL MEDICINE

## 2024-09-09 PROCEDURE — 80053 COMPREHEN METABOLIC PANEL: CPT | Performed by: INTERNAL MEDICINE

## 2024-09-09 PROCEDURE — 80061 LIPID PANEL: CPT | Performed by: INTERNAL MEDICINE

## 2024-09-09 PROCEDURE — 83036 HEMOGLOBIN GLYCOSYLATED A1C: CPT | Performed by: INTERNAL MEDICINE

## 2024-09-09 PROCEDURE — 87591 N.GONORRHOEAE DNA AMP PROB: CPT

## 2024-09-09 PROCEDURE — 85027 COMPLETE CBC AUTOMATED: CPT | Performed by: INTERNAL MEDICINE

## 2024-09-09 RX ORDER — PREDNISONE 10 MG/1
TABLET ORAL
Qty: 18 TABLET | Refills: 0 | Status: SHIPPED | OUTPATIENT
Start: 2024-09-09 | End: 2024-09-17

## 2024-09-09 NOTE — PROGRESS NOTES
INTERNAL MEDICINE ANNUAL EXAM NOTE     Patient ID: Sanket Sweeney is a 34 year old male.  Chief Complaint: Physical and Back Pain      Sanket Sweeney is a pleasant 34 year old male who presents for annual physical exam. Sanket Sweeney is doing well today.  MRI scheduled, feeling a little better.     Health Maintenance  - All care gaps addressed with patient.     Review of Systems  Review of Systems   Constitutional:  Negative for unexpected weight change.   HENT:  Negative for hearing loss.    Eyes:  Negative for pain and visual disturbance.   Respiratory:  Negative for shortness of breath.    Cardiovascular:  Negative for chest pain, palpitations and leg swelling.   Gastrointestinal:  Negative for abdominal pain and blood in stool.   Genitourinary:  Negative for difficulty urinating and hematuria.   Neurological:  Negative for tremors and syncope.   Psychiatric/Behavioral: Negative.         Physical Exam  Vitals:    09/09/24 1402   BP: 131/80   Pulse: 93   SpO2: 97%   Weight: 224 lb 12.8 oz (102 kg)   Height: 6' 2\" (1.88 m)     Body mass index is 28.86 kg/m².  BP Readings from Last 3 Encounters:   09/09/24 131/80   08/19/24 122/79   08/16/24 123/83     Physical Exam  Vitals and nursing note reviewed.   Constitutional:       General: He is not in acute distress.     Appearance: Normal appearance.   HENT:      Head: Normocephalic.      Right Ear: External ear normal.      Left Ear: External ear normal.   Eyes:      Extraocular Movements: Extraocular movements intact.      Conjunctiva/sclera: Conjunctivae normal.   Cardiovascular:      Rate and Rhythm: Normal rate and regular rhythm.      Pulses: Normal pulses.      Heart sounds: Normal heart sounds.   Pulmonary:      Effort: Pulmonary effort is normal. No respiratory distress.      Breath sounds: Normal breath sounds. No wheezing.   Abdominal:      General: Abdomen is flat. Bowel sounds are normal.      Tenderness: There is no abdominal tenderness.    Musculoskeletal:         General: Normal range of motion.      Cervical back: Normal range of motion and neck supple.   Skin:     Coloration: Skin is not jaundiced.   Neurological:      General: No focal deficit present.      Mental Status: He is alert and oriented to person, place, and time. Mental status is at baseline.   Psychiatric:         Mood and Affect: Mood normal.         Behavior: Behavior normal.           Labs & Imaging  Pertinent labs and imaging reviewed.   No results found for: \"GLU\", \"BUN\", \"BUNCREA\", \"CREATSERUM\", \"ANIONGAP\", \"GFR\", \"GFRNAA\", \"GFRAA\", \"CA\", \"OSMOCALC\", \"ALKPHO\", \"AST\", \"ALT\", \"ALKPHOS\", \"BILT\", \"TP\", \"ALB\", \"GLOBULIN\", \"AGRATIO\", \"NA\", \"K\", \"CL\", \"CO2\"  No results found for: \"EAG\", \"A1C\"  No results found for: \"WBC\", \"RBC\", \"HGB\", \"HCT\", \"MCV\", \"MCH\", \"MCHC\", \"RDW\", \"PLT\", \"MPV\"  No results found for: \"CHOLEST\", \"TRIG\", \"HDL\", \"LDL\", \"VLDL\", \"TCHDLRATIO\", \"NONHDLC\", \"CHOLHDLRATIO\", \"CALCNONHDL\"  The ASCVD Risk score (Maureen DK, et al., 2019) failed to calculate for the following reasons:    The 2019 ASCVD risk score is only valid for ages 40 to 79    Medical History    Reviewed allergies:  No Known Allergies     Reviewed:  There are no problems to display for this patient.     Reviewed:  Past Medical History:    Infectious mononucleosis      Reviewed:  History reviewed. No pertinent family history.    Reviewed:  History reviewed. No pertinent surgical history.   Reviewed:  Social History     Socioeconomic History    Marital status: Single   Tobacco Use    Smoking status: Never     Passive exposure: Never    Smokeless tobacco: Never   Vaping Use    Vaping status: Never Used   Substance and Sexual Activity    Alcohol use: Never    Drug use: Never   Social History Narrative    ** Merged History Encounter **           Reviewed:  Current Outpatient Medications   Medication Sig Dispense Refill    predniSONE 10 MG Oral Tab Take 3 tablets (30 mg total) by mouth daily for 3 days, THEN 2  tablets (20 mg total) daily for 3 days, THEN 1 tablet (10 mg total) daily for 3 days. TAKE WITH FOOD.. 18 tablet 0    cyclobenzaprine 10 MG Oral Tab Take 0.5-1 tablets (5-10 mg total) by mouth 3 (three) times daily as needed for Muscle spasms. May cause sedation; no driving. 90 tablet 3          Assessment & Plan    1. Annual physical exam  - Comp Metabolic Panel (14)  - Hemoglobin A1C  - CBC, Platelet; No Differential  - Lipid Panel  - TSH W Reflex To Free T4    2. Screening for endocrine, nutritional, metabolic and immunity disorder  - Comp Metabolic Panel (14)  - Hemoglobin A1C  - CBC, Platelet; No Differential  - Lipid Panel  - TSH W Reflex To Free T4  - Vitamin D    3. Vitamin D deficiency  - Vitamin D    4. Screening for STD (sexually transmitted disease)  - Chlamydia/Gc Amplification; Future  Plan  Overall doing well today. Screening labs, preventive imaging/procedure, and health care gaps addressed as per USPSTF guidelines, orders available electronically via "Mobile Location, IP" and in AVS.  Vaccines discussed and administered depending on availability and per patient preference; patient to return for any outstanding vaccines once available.  Patient brought to  to help schedule care gaps and follow up. Further recommendations depending on lab results.            Follow Up:   Return for 1 YEAR FOR ANNUAL OR DEPENDING ON LAB RESULTS.      Reggie Jo MD  Internal Medicine      Patient asked to sign release of information for outside records if not already requested, make future office/imaging appointments at the  prior to leaving, and to sign up for "Mobile Location, IP" if not already active.  Preventive measures and further education discussed with patient as per after visit summary. Potential medication side effects discussed. All questions answered to best of ability.   Call office with any questions. Seek emergency care if necessary.   Patient understands and agrees to follow directions and  advice.      ----------------------------------------- PATIENT INSTRUCTIONS-----------------------------------------     There are no Patient Instructions on file for this visit.

## 2024-09-10 LAB
C TRACH DNA SPEC QL NAA+PROBE: NEGATIVE
N GONORRHOEA DNA SPEC QL NAA+PROBE: NEGATIVE

## 2024-09-16 ENCOUNTER — OFFICE VISIT (OUTPATIENT)
Dept: PHYSICAL MEDICINE AND REHAB | Facility: CLINIC | Age: 34
End: 2024-09-16
Payer: MEDICAID

## 2024-09-16 VITALS — WEIGHT: 224 LBS | BODY MASS INDEX: 28.75 KG/M2 | HEIGHT: 74 IN

## 2024-09-16 DIAGNOSIS — M54.16 LUMBAR RADICULOPATHY, CHRONIC: Primary | ICD-10-CM

## 2024-09-16 RX ORDER — GABAPENTIN 300 MG/1
300 CAPSULE ORAL 2 TIMES DAILY
Qty: 60 CAPSULE | Refills: 1 | Status: SHIPPED | OUTPATIENT
Start: 2024-09-16

## 2024-09-16 NOTE — PROGRESS NOTES
NEW PATIENT VISIT    CHIEF COMPLAINT  Low back pain    HISTORY OF PRESENTING ILLNESS  Sanket Sweeney is a 34 year old male who presents for evaluation of low back pain.  Patient presents to my office as a referral from his primary care physician complaining of midline low back aching pain which radiates down the left lower extremity.  No significant numbness and tingling but does endorse some left leg weakness.  Currently rates his level pain 4/10.  He has MRI of the lumbar spine scheduled.    No physical therapy.  Symptom onset was in June 2024.    No interventions.  Currently uses Flexeril twice a day and completed oral steroid taper.    He states that he has been dealing with low back issues with intermittent left leg symptoms over the last 8 years. He states that onset was in June, while getting in a car. Was feeling his low back prior to the acute flare at the end of June. He tried sleeping on the floor, but this did not help, then while getting into this car, he endorses a sensation of his back locking up. He was not in a car accident.     He endorses improvement with the steroid pack which has persisted, he also takes flexeril once or twice a day.     He states that he still has some lingering complaints. His pain down his left leg is in the left posterior calf, left lateral thigh and intermittently in the left ankles. He states that laying daown he feels it more in the left lower leg. While sitting he feels in in the left lateral thigh.     He is still limping.       Chief Complaint   Patient presents with    Low Back Pain     New patient, referred by Dr. Reggie Jo, comes in with midline low back aching pain that radiates down L leg. Denies T/N. Admits L leg weakness. Rates pain 4/10. MRI scheduled. Denies PT. Symptoms began 6/2024, denies injury. Denies injections. Takes Flexeril 10mg BID. Completed oral steroid taper.       PAST MEDICAL HISTORY  Past Medical History:    Infectious mononucleosis        PAST SURGICAL HISTORY  No past surgical history on file.    MEDICATIONS  Current Outpatient Medications on File Prior to Visit   Medication Sig Dispense Refill    predniSONE 10 MG Oral Tab Take 3 tablets (30 mg total) by mouth daily for 3 days, THEN 2 tablets (20 mg total) daily for 3 days, THEN 1 tablet (10 mg total) daily for 3 days. TAKE WITH FOOD.. 18 tablet 0    cyclobenzaprine 10 MG Oral Tab Take 0.5-1 tablets (5-10 mg total) by mouth 3 (three) times daily as needed for Muscle spasms. May cause sedation; no driving. 90 tablet 3     No current facility-administered medications on file prior to visit.       ALLERGIES  No Known Allergies    SOCIAL HISTORY   reports that he has never smoked. He has never been exposed to tobacco smoke. He has never used smokeless tobacco. He reports that he does not drink alcohol and does not use drugs.    FAMILY HISTORY  No family history on file.    REVIEW OF SYSTEMS  Complete review of systems was performed and was negative except for those items stated in the History of Presenting Illness and Past Medical/Surgical History.    PHYSICAL EXAMINATION  GENERAL:  In no acute distress. Well-developed and well nourished.   SKIN: No rashes or open wounds involving posterior torso, posterior pelvis, lower extremities.  NEUROLOGIC:   Strength: Patient has very slight 4/5 strength in his knee extension as well as hip flexion on the left.  Otherwise well-maintained strength in right lower extremity as well as distally in the left lower extremity.  Sensation: intact light touch sensation throughout both lower extremities.   Reflexes: intact and symmetric in bilateral lower extremities. Babinski downgoing bilaterally. No clonus.   Gait: able to heel walk, toe walk, and perform tandem gait.   MUSCULOSKELETAL:  Inspection: Normal alignment of lumbar spine. No shift or scoliosis. Normal posture. Equal iliac crest heights. No pelvic asymmetry.   Palpation: He is some tenderness palpation of  lumbar paraspinal musculature bilaterally.  ROM: Active lumbar spine ROM is slightly restricted in forward flexion and extension with exacerbation of pain.. Passive bilateral hip ROM is full and pain-free.  Special Tests:   Slump sit positive on the left reproduction of posterior lateral thigh, posterior calf reproduction of some left-sided radicular symptoms with right-sided slump sit.  Provide maneuvers are negative.    REVIEW OF PRIOR X-RAYS/STUDIES  MRI lumbar spine pending    IMPRESSION/DIAGNOSIS  Encounter Diagnosis   Name Primary?    Lumbar radiculopathy, chronic Yes       TREATMENT/PLAN  High suspicion for compressive pathology in the lumbar spine possibly effecting a left-sided L5 and S1 nerve roots.  MRI will be very beneficial in terms of planning for directed spinal intervention.  Will trial gabapentin at night without titration to twice a day dosing if tolerated.    Additionally patient with working lumbar spine stabilization, core strengthening abdominal home exercise program.    Education was provided regarding the above impression/diagnosis and treatment options/plan were discussed.  All questions were answered during today's visit.  Patient will contact clinic if any other questions or concerns.            Aashish Mcnally,   Interventional Spine and Sports Medicine Specialist   Physical Medicine and Rehabilitation  [address] [address]    [address] [place] [address]

## 2024-09-30 ENCOUNTER — HOSPITAL ENCOUNTER (OUTPATIENT)
Dept: MRI IMAGING | Age: 34
Discharge: HOME | End: 2024-09-30
Attending: INTERNAL MEDICINE
Payer: MEDICAID

## 2024-09-30 DIAGNOSIS — M48.062 NEUROGENIC CLAUDICATION DUE TO LUMBAR SPINAL STENOSIS: ICD-10-CM

## 2024-09-30 PROCEDURE — 72148 MRI LUMBAR SPINE W/O DYE: CPT | Performed by: INTERNAL MEDICINE

## 2024-10-01 ENCOUNTER — OFFICE VISIT (OUTPATIENT)
Dept: PHYSICAL MEDICINE AND REHAB | Facility: CLINIC | Age: 34
End: 2024-10-01

## 2024-10-01 VITALS — BODY MASS INDEX: 28.75 KG/M2 | HEIGHT: 74 IN | WEIGHT: 224 LBS

## 2024-10-01 DIAGNOSIS — M48.062 NEUROGENIC CLAUDICATION DUE TO LUMBAR SPINAL STENOSIS: ICD-10-CM

## 2024-10-01 DIAGNOSIS — M54.16 LUMBAR RADICULOPATHY, CHRONIC: Primary | ICD-10-CM

## 2024-10-01 PROCEDURE — 99214 OFFICE O/P EST MOD 30 MIN: CPT | Performed by: PHYSICAL MEDICINE & REHABILITATION

## 2024-10-01 RX ORDER — CYCLOBENZAPRINE HCL 10 MG
TABLET ORAL 3 TIMES DAILY PRN
Qty: 90 TABLET | Refills: 3 | Status: SHIPPED | OUTPATIENT
Start: 2024-10-01

## 2024-10-01 RX ORDER — GABAPENTIN 300 MG/1
300 CAPSULE ORAL 2 TIMES DAILY
Qty: 60 CAPSULE | Refills: 1 | Status: SHIPPED | OUTPATIENT
Start: 2024-10-01

## 2024-10-01 NOTE — PROGRESS NOTES
RETURN PATIENT VISIT    CHIEF COMPLAINT  Low back and left lower extremity radicular complaints    INTERVAL HISTORY  Sanket Sweeney is a 34 year old who was last seen in clinic on 9/16/2024 with chronic left-sided low back and left lower extremity radicular complaints.  Patient is returning today in follow-up for MRI review.  This is reviewed in full below, images were reviewed with the patient.  Patient states that he continues to experience midline low back pain which radiates down to his left lower extremity similar to previous visits.  He has left leg weakness.  Currently rates his level pain 2/10 at rest but this can worsen with specific activities.    He currently uses gabapentin 300 mg twice a day as well as Flexeril 10 mg as needed.  Denies any red flags.    REVIEW OF SYSTEMS  Review of systems was completed with the patient today as pertinent to today's visit    PHYSICAL EXAMINATION  CONSTITUTIONAL: Well-appearing, in no apparent distress  EYES: No scleral icterus or conjunctival hemorrhage  CARDIOVASCULAR: Skin warm and well-perfused, no peripheral edema  RESPIRATORY: Breathing unlabored without accessory muscle use  PSYCHIATRIC: Alert, cooperative, appropriate mood and affect  SKIN: No lesions or rashes on exposed skin  MUSCULOSKELETAL: Good range of motion of the lumbar spine in flexion and extension mild exacerbation of axial low back complaints with forward flexion and extension.  Ongoing positive slump sit on the left side.  NEUROLOGIC: Able strength and sensation in the bilateral lower extremities, slight plantarflexion weakness on the left side.    REVIEW OF PRIOR X-RAYS/STUDIES  Independently reviewed the MRI of the lumbar spine dated 9/30/2024 which reveals left paracentral disc extrusion resulting in severe left lateral recess narrowing likely contributing to his symptoms.    IMPRESSION/DIAGNOSIS  1.    Encounter Diagnosis   Name Primary?    Lumbar radiculopathy, chronic Yes          TREATMENT/PLAN  Consider left L5 and S1 TFESI if needed.  Will hold for now    Will refill cyclobenzaprine. Physical therapy to start.     Education was provided regarding the above impression/diagnosis and treatment options/plan were discussed.  All questions were answered during today's visit.  Patient will contact clinic if any other questions or concerns.          Aashish Mcnally DO  Interventional Spine and Sports Medicine Specialist   Physical Medicine and Rehabilitation  [address] [address]    [address] [place] [address]

## 2024-11-14 DIAGNOSIS — M48.062 NEUROGENIC CLAUDICATION DUE TO LUMBAR SPINAL STENOSIS: ICD-10-CM

## 2024-11-14 RX ORDER — GABAPENTIN 300 MG/1
300 CAPSULE ORAL 2 TIMES DAILY
Qty: 60 CAPSULE | Refills: 1 | Status: CANCELLED | OUTPATIENT
Start: 2024-11-14

## 2024-11-14 RX ORDER — CYCLOBENZAPRINE HCL 10 MG
TABLET ORAL 3 TIMES DAILY PRN
Qty: 90 TABLET | Refills: 3 | Status: CANCELLED | OUTPATIENT
Start: 2024-11-14

## 2024-11-18 NOTE — TELEPHONE ENCOUNTER
S/w [facility] pharmacy who states there are still refills on file for these medications. Asked pharmacy to process these at this time.     Pt's request removed as the pharmacy will process the refills they have on file. Sent pt MCM to let him know of this.

## 2025-01-31 RX ORDER — GABAPENTIN 300 MG/1
300 CAPSULE ORAL 2 TIMES DAILY
Qty: 60 CAPSULE | Refills: 1 | Status: SHIPPED | OUTPATIENT
Start: 2025-01-31

## 2025-01-31 NOTE — TELEPHONE ENCOUNTER
Refill Request    Medication request: gabapentin 300 MG Oral Cap   Take 1 capsule (300 mg total) by mouth in the morning and 1 capsule (300 mg total) before bedtime. - Oral   LOV:10/1/2024 Aashish Mcnally DO   Due back to clinic per last office note:  Not mentioned  NOV: Visit date not found      [place]: 11/18/2024 #60 (30 day supply)    Urine drug screen (if applicable): N/A  Pain contract: N/A    LOV plan (if weaning or changing medications): Not mentioned

## 2025-01-31 NOTE — TELEPHONE ENCOUNTER
New symptoms: Bilateral low back pain (doesn't radiate)  Date symptoms Began: yesterday   Current treatment: Had left over Gabapentin (taking as ordered), Cyclobenzaprine (had left over), Prednisone taper( Had left over as he never completed the treatment before), Tumeric.          Seen in ER/Urgent care: No    Patient stated that yestereday he was carrying something to the car when he injured his back (his back locked). Now pain s so severe patient has been sleeping on the floor, not able to sit or stand.     Numeric Rating Scale:  Pain at Present:  10                                                                                                                Description of Pain:   sharp/stabbing  Aggravating Factors:    any movement    LOV: 10/1/2024 Aashish Mcnally DO    NOV: 2/4/2025 Aashish Mcnally DO     Summary of patient request: Gabapentin refill.     Patient was advised to call 911 or have someone take him to ED since pain is so severe. Patient hesitant.     Scheduled patient for a follow up in office with Dr Mcnlaly.     Routed as high priority to Don call provider

## 2025-02-04 ENCOUNTER — OFFICE VISIT (OUTPATIENT)
Dept: PHYSICAL MEDICINE AND REHAB | Facility: CLINIC | Age: 35
End: 2025-02-04
Payer: MEDICAID

## 2025-02-04 ENCOUNTER — TELEPHONE (OUTPATIENT)
Dept: PHYSICAL MEDICINE AND REHAB | Facility: CLINIC | Age: 35
End: 2025-02-04

## 2025-02-04 VITALS — WEIGHT: 224 LBS | HEIGHT: 74 IN | BODY MASS INDEX: 28.75 KG/M2

## 2025-02-04 DIAGNOSIS — M54.16 LUMBAR RADICULOPATHY: Primary | ICD-10-CM

## 2025-02-04 PROCEDURE — 99214 OFFICE O/P EST MOD 30 MIN: CPT | Performed by: PHYSICAL MEDICINE & REHABILITATION

## 2025-02-04 NOTE — PROGRESS NOTES
RETURN PATIENT VISIT    CHIEF COMPLAINT  Lumbar radiculopathy    INTERVAL HISTORY  Sanket Sweeney is a 34 year old who was last seen in clinic on 10/1/2020 for following up on bilateral low back pain with throbbing and stabbing sensations.  Endorses numbness and tingling in the low back and endorses some weakness in the bilateral legs and low back.  Currently rates his level pain 1/10 but this can reach 10/10 when spasming.    Currently uses Flexeril 10 mg twice a day, gabapentin 300 mg 3 times a day and is currently on a prednisone taper.  This was prescribed by his primary care physician.    States that 4 days ago he was carrying a heavy chair to his car, twisted, and noted severe pain in the low back, this caused his back to spasm and significant low back pain and this made his walking very difficult. He states that he did not get a return to his radicular pain all the way down his legs, but did note significant nerve pain in the low back.     REVIEW OF SYSTEMS  Review of systems was completed with the patient today as pertinent to today's visit    PHYSICAL EXAMINATION  CONSTITUTIONAL: Well-appearing, in no apparent distress  EYES: No scleral icterus or conjunctival hemorrhage  CARDIOVASCULAR: Skin warm and well-perfused, no peripheral edema  RESPIRATORY: Breathing unlabored without accessory muscle use  PSYCHIATRIC: Alert, cooperative, appropriate mood and affect  SKIN: No lesions or rashes on exposed skin  MUSCULOSKELETAL: Patient has a severely antalgic gait, using a rolling walker at today's visit.  He is able to ambulate without the rolling walker however he endorses weakness in the low back.  He has negative hip provocative maneuvers but has restricted range of motion of the low back with pretty severe pain with forward flexion.  NEUROLOGIC: He has a positive neural tension with positive slump sit bilaterally with reproduction of low back and thigh pain as well as shooting pain through the low  back.    REVIEW OF PRIOR X-RAYS/STUDIES  MRI of the lumbar spine dated 9/30/2024 which reveals left paracentral disc extrusion resulting in severe left lateral recess narrowing likely contributing to his symptoms.     IMPRESSION/DIAGNOSIS  1.    Encounter Diagnosis   Name Primary?    Lumbar radiculopathy Yes     TREATMENT/PLAN  ILESI, finish prednisone taper as prescribed by his primary care physician.    Education was provided regarding the above impression/diagnosis and treatment options/plan were discussed.  All questions were answered during today's visit.  Patient will contact clinic if any other questions or concerns.          Aashish Mcnally,   Interventional Spine and Sports Medicine Specialist   Physical Medicine and Rehabilitation  [address] [address]    [facility]  [address] [facility] [address] [place] [address]

## 2025-02-04 NOTE — TELEPHONE ENCOUNTER
Initiated authorization for Paracentral left L5/S1 ILESI with fluoroscopic guidance. CPT/HCPCS 72888 dx:M54.16 to be done at [facility] with [facility]    Status: Pending  Reference: [id number]

## 2025-02-07 NOTE — TELEPHONE ENCOUNTER
Patient has been scheduled for Paracentral left L5/S1 Interlaminar epidural steroid injection on 3/14/25 at [facility] with Dr. Mcnally.   Anesthesia type:  Local  Arrival Time: 930am & Procedure Time: 1030am   Patient was advised that if he/she does receive the covid vaccine it needs to be at least 2 weeks before or after the injection.  Medications and allergies reviewed.  Educated to hold NSAIDS (Aleve, Ibuprofen, Motrin, Advil) and anti-inflammatories (Meloxicam, Naproxen, Diclofenac, Celebrex) and for cervical injections must hold Multi-Vitamins, Vitamin E, Fish Oil/Omega-3.  Patient informed of [facility]'s  policy:  he/she will need a  to and from procedure.   Endoscopy is located in the [facility] [address], [facility], [address]. 2nd floor to check in.  P:  [phone]     may park in the blue/green lot.  Patient verbalized understanding and agrees with plan.  Scheduled in Epic: Yes  Scheduled in Surgical Case: Yes  Follow up appointment made: NOV: Visit date not found

## 2025-02-07 NOTE — TELEPHONE ENCOUNTER
Status: Approved - no action required  Reference/Authorization # [id number]  Authorization is not required based on medical necessity, however, is not a guarantee of payment and may be subject to review once claim is submitted.

## 2025-02-12 ENCOUNTER — TELEMEDICINE (OUTPATIENT)
Facility: LOCATION | Age: 35
End: 2025-02-12
Payer: MEDICAID

## 2025-02-12 DIAGNOSIS — M48.062 NEUROGENIC CLAUDICATION DUE TO LUMBAR SPINAL STENOSIS: ICD-10-CM

## 2025-02-12 PROCEDURE — 99214 OFFICE O/P EST MOD 30 MIN: CPT | Performed by: INTERNAL MEDICINE

## 2025-02-12 RX ORDER — CYCLOBENZAPRINE HCL 10 MG
TABLET ORAL 3 TIMES DAILY PRN
Qty: 90 TABLET | Refills: 3 | Status: SHIPPED | OUTPATIENT
Start: 2025-02-12

## 2025-02-12 RX ORDER — PREDNISONE 20 MG/1
20 TABLET ORAL 2 TIMES DAILY WITH MEALS
Qty: 14 TABLET | Refills: 0 | Status: SHIPPED | OUTPATIENT
Start: 2025-02-12 | End: 2025-02-19

## 2025-02-12 RX ORDER — GABAPENTIN 300 MG/1
300 CAPSULE ORAL EVERY 8 HOURS
Qty: 270 CAPSULE | Refills: 5 | Status: SHIPPED | OUTPATIENT
Start: 2025-02-12

## 2025-02-12 NOTE — PROGRESS NOTES
INTERNAL MEDICINE VIDEO VISIT NOTE     Patient ID: Sanket Sweeney is a 34 year old male.  Today's Date: 02/12/25  HPI  Pleasant 35 y/o with chronic back pain who presents for follow-up.  He has significant disc desiccation and disc protrusion of the lumbosacral region.  Physiatry notes reviewed and appreciated.  He has been compliant gabapentin 300 twice daily, he did have some prednisone after he reexacerbated his back about a week ago however he continues to have the pain.  He was carrying a heavy box and then had to twist as he lost his .  Prior to this he was doing much better.  Plans to get an injection on 14 March.    There were no vitals filed for this visit.  body mass index is unknown because there is no height or weight on file.  BP Readings from Last 3 Encounters:   09/09/24 131/80   08/19/24 122/79   08/16/24 123/83     The ASCVD Risk score (Maureen LANGSTON, et al., 2019) failed to calculate for the following reasons:    The 2019 ASCVD risk score is only valid for ages 40 to 79  Medications reviewed:  Current Outpatient Medications   Medication Sig Dispense Refill    predniSONE 20 MG Oral Tab Take 1 tablet (20 mg total) by mouth 2 (two) times daily with meals for 7 days. 14 tablet 0    gabapentin 300 MG Oral Cap Take 1 capsule (300 mg total) by mouth every 8 (eight) hours. FOR NERVE PAIN. 270 capsule 5    cyclobenzaprine 10 MG Oral Tab Take 0.5-1 tablets (5-10 mg total) by mouth 3 (three) times daily as needed for Muscle spasms. May cause sedation; no driving. 90 tablet 3         Assessment & Plan    1. Neurogenic claudication due to lumbar spinal stenosis  -     predniSONE; Take 1 tablet (20 mg total) by mouth 2 (two) times daily with meals for 7 days.  Dispense: 14 tablet; Refill: 0  -     Gabapentin; Take 1 capsule (300 mg total) by mouth every 8 (eight) hours. FOR NERVE PAIN.  Dispense: 270 capsule; Refill: 5  -     Cyclobenzaprine HCl; Take 0.5-1 tablets (5-10 mg total) by mouth 3 (three)  times daily as needed for Muscle spasms. May cause sedation; no driving.  Dispense: 90 tablet; Refill: 3  Plan  Appears he is reexacerbated is known pathology, will give him another course of prednisone given that this to help previously, he advised that he may temporarily increase his gabapentin to 300 every 8 given that the procedure is not for another month however we will leave it to him.  He can continue use the muscle relaxer for acute spasm relief.  Advised him to see if he can be seen sooner by physiatry for the injection.    Follow Up: Return for AS NEEDED/ IF SYMPTOMS WORSEN..         Objective: Results:   Physical Exam  Nursing note reviewed.   Constitutional:       General: He is not in acute distress.     Appearance: Normal appearance.   HENT:      Head: Normocephalic and atraumatic.      Right Ear: External ear normal.      Left Ear: External ear normal.      Nose: Nose normal.   Eyes:      Conjunctiva/sclera: Conjunctivae normal.   Pulmonary:      Effort: Pulmonary effort is normal. No respiratory distress.   Musculoskeletal:      Cervical back: Normal range of motion and neck supple.   Skin:     Coloration: Skin is not jaundiced.   Neurological:      General: No focal deficit present.      Mental Status: He is alert and oriented to person, place, and time. Mental status is at baseline.   Psychiatric:         Mood and Affect: Mood normal.         Thought Content: Thought content normal.        Reviewed:  There are no active problems to display for this patient.     Allergies[1]     Social History     Socioeconomic History    Marital status: Single   Tobacco Use    Smoking status: Never     Passive exposure: Never    Smokeless tobacco: Never   Vaping Use    Vaping status: Never Used   Substance and Sexual Activity    Alcohol use: Never    Drug use: Never   Social History Narrative    ** Merged History Encounter **           Review of Systems  All other systems negative unless otherwise stated in ROS or  HPI above.       Reggie Jo MD  Internal Medicine       Call office with any questions or seek emergency care if necessary.   Patient understands and agrees to follow directions and advice.    Telehealth Verbal Consent   I conducted a telehealth visit with Sankte Sweeney today, 02/12/25, which was completed using two-way, real-time interactive audio and video communication. This has been done in good carey to provide continuity of care in the best interest of the provider-patient relationship, due to the COVID - public health crisis/national emergency where restrictions of face-to-face office visits are ongoing. Every conscious effort was taken to allow for sufficient and adequate time to complete the visit.The patient was made aware of the limitations of the telehealth visit, including treatment limitations as no physical exam could be performed.  The patient was advised to call 911 or to go to the ER in case there was an emergency.  The patient was also advised of the potential privacy & security concerns related to the telehealth platform. The patient was made aware of where to find [facility]'s notice of privacy practices, telehealth consent form and other related consent forms and documents.  which are located on the [facility] website. The patient verbally agreed to telehealth consent form, related consents and the risks discussed.  Lastly, the patient confirmed that they were in [place]. Included in this visit, time may have been spent reviewing labs, medications, radiology tests and decision making. Appropriate medical decision-making and tests are ordered as detailed in the plan of care above.  Coding/billing information is submitted for this visit based on complexity of care and/or time spent for the visit.  ----------------------------------------- PATIENT INSTRUCTIONS-----------------------------------------     There are no Patient Instructions on file for this visit.           [1] No Known Allergies

## 2025-02-17 ENCOUNTER — PATIENT MESSAGE (OUTPATIENT)
Dept: PHYSICAL MEDICINE AND REHAB | Facility: CLINIC | Age: 35
End: 2025-02-17

## 2025-02-17 ENCOUNTER — TELEPHONE (OUTPATIENT)
Dept: INTERNAL MEDICINE CLINIC | Facility: CLINIC | Age: 35
End: 2025-02-17

## 2025-02-17 NOTE — TELEPHONE ENCOUNTER
Patient called regarding the following medication refill:    gabapentin 300 MG Oral Cap     Per patient his pharmacy stated they need authorization from the Dr. Patient will be out of medication after today. Per patient please send refill to the following pharmacy:    [facility]     [address]    [place]

## 2025-02-21 NOTE — TELEPHONE ENCOUNTER
See TE 02/17/2025, pt's PCP increased Gabapentin and called to confirm order on 02/17/2025. Patient has 1 year supply on file with pharmacy. Nothing additional needed at this time.

## 2025-03-07 ENCOUNTER — HOSPITAL ENCOUNTER (OUTPATIENT)
Facility: HOSPITAL | Age: 35
Setting detail: HOSPITAL OUTPATIENT SURGERY
Discharge: HOME | End: 2025-03-07
Attending: PHYSICAL MEDICINE & REHABILITATION | Admitting: PHYSICAL MEDICINE & REHABILITATION
Payer: MEDICAID

## 2025-03-07 ENCOUNTER — APPOINTMENT (OUTPATIENT)
Dept: GENERAL RADIOLOGY | Facility: HOSPITAL | Age: 35
End: 2025-03-07
Attending: PHYSICAL MEDICINE & REHABILITATION
Payer: MEDICAID

## 2025-03-07 VITALS
RESPIRATION RATE: 22 BRPM | BODY MASS INDEX: 28.75 KG/M2 | WEIGHT: 224 LBS | TEMPERATURE: 98 F | HEIGHT: 74 IN | HEART RATE: 80 BPM | DIASTOLIC BLOOD PRESSURE: 61 MMHG | OXYGEN SATURATION: 98 % | SYSTOLIC BLOOD PRESSURE: 112 MMHG

## 2025-03-07 PROBLEM — M54.16 LUMBAR RADICULOPATHY: Status: ACTIVE | Noted: 2025-03-07

## 2025-03-07 PROCEDURE — 62323 NJX INTERLAMINAR LMBR/SAC: CPT | Performed by: PHYSICAL MEDICINE & REHABILITATION

## 2025-03-07 PROCEDURE — 3E0S33Z INTRODUCTION OF ANTI-INFLAMMATORY INTO EPIDURAL SPACE, PERCUTANEOUS APPROACH: ICD-10-PCS | Performed by: PHYSICAL MEDICINE & REHABILITATION

## 2025-03-07 PROCEDURE — 3E0S3BZ INTRODUCTION OF ANESTHETIC AGENT INTO EPIDURAL SPACE, PERCUTANEOUS APPROACH: ICD-10-PCS | Performed by: PHYSICAL MEDICINE & REHABILITATION

## 2025-03-07 RX ORDER — NALOXONE HYDROCHLORIDE 0.4 MG/ML
0.08 INJECTION, SOLUTION INTRAMUSCULAR; INTRAVENOUS; SUBCUTANEOUS AS NEEDED
Status: DISCONTINUED | OUTPATIENT
Start: 2025-03-07 | End: 2025-03-07

## 2025-03-07 RX ORDER — ONDANSETRON 2 MG/ML
4 INJECTION INTRAMUSCULAR; INTRAVENOUS ONCE AS NEEDED
Status: DISCONTINUED | OUTPATIENT
Start: 2025-03-07 | End: 2025-03-07

## 2025-03-07 RX ORDER — LIDOCAINE HYDROCHLORIDE 10 MG/ML
INJECTION, SOLUTION EPIDURAL; INFILTRATION; INTRACAUDAL; PERINEURAL
Status: DISCONTINUED | OUTPATIENT
Start: 2025-03-07 | End: 2025-03-07

## 2025-03-07 RX ORDER — BETAMETHASONE SODIUM PHOSPHATE AND BETAMETHASONE ACETATE 3; 3 MG/ML; MG/ML
INJECTION, SUSPENSION INTRA-ARTICULAR; INTRALESIONAL; INTRAMUSCULAR; SOFT TISSUE
Status: DISCONTINUED | OUTPATIENT
Start: 2025-03-07 | End: 2025-03-07

## 2025-03-07 NOTE — DISCHARGE INSTRUCTIONS
Home Care Instructions Following Your Pain Procedure     Sanket,  It has been a pleasure to have you as our patient. To help you at home, you must follow these general discharge instructions. We will review these with you before you are discharged. It is our hope that you have a complete and uneventful recovery from our procedure.     General Instructions:  What to Expect:  Bandages from your procedure today can be removed when you get home.  Please avoid soaking and/or swimming for 24 hours.  Showering is okay  It is normal to have increased pain symptoms and/or pain at injection site for up to 3-5 days after procedure, you can use heat or ice (20 minutes on 20 minutes off) for comfort.  You may experience some temporary side effects which may include restlessness or insomnia, flushing of the face, or heart palpitations.  Please contact the provider if these symptoms do not resolve within 3-4 days.  Lightheadedness or nausea may occur and should resolve within 24 to 48 hours.  If you develop a headache after treatment, rest, drink fluids (with caffeine, if possible) and take mild over-the-counter pain medication.  If the headache does not improve with the above treatment, contact the physician.  Home Medications:  Resume all previously prescribed medication.  Please avoid taking NSAIDs (Non-Steriodal Anti-Inflammatory Drugs) such as:  Ibuprofen ( Advil, Motrin) Aleve (Naproxen), Diclofenac, Meloxicam for 6 hours after procedure.   If you are on Coumadin (Warfarin) or any other anti-coagulant (or \"blood thinning\") medication such as Plavix (Clopidogrel), Xarelto (Rivaroxaban), Eliquis (Apixaban), Effient (Prasugrel) etc., restart on the following day from the procedure unless otherwise directed by your provider.  If you are a diabetic, please increase the frequency of your glucose monitoring after the procedure as steroids may cause a temporary (2-3 day) increase in your blood sugar.  Contact your primary care  physician if your blood sugar remains elevated as you may require some medication adjustment.  Diet:  Resume your regular diet as tolerated.  Activity:  We recommend that you relax and rest during the rest of your procedure day.  If you feel weakness in your arms or legs do not drive.  Follow-up Appointment  Please schedule a follow-up visit within 3 to 4 weeks after your last procedure date.  Question or Concerns:  Feel free to call our office with any questions or concerns at [phone] (option #2)    Sanket  Thank you for coming to [facility] for your procedure.  The nurses try very hard to make sure you receive the best care possible.  Your trust in them as well as us is greatly appreciated.

## 2025-03-07 NOTE — H&P
[facility]   part of [facility]    History & Physical    Sanket Sweeney Patient Status:  Hospital Outpatient Surgery    1990 MRN [id number]   Location [facility] ENDOSCOPY PAIN CENTER Attending Aashish Mcnally DO   Hosp Day # 0 PCP Reggie Jo MD     Date of Admission:  (3/7/2025    History of Present Illness:  Sanket Sweeney is a(n) 34 year old male.  Patient with low back and left predominant lower extremity radicular complaints presenting to the procedural suite for lumbar intervention.    History:  Past Medical History:    Infectious mononucleosis     No past surgical history on file.  No family history on file.   reports that he has never smoked. He has never been exposed to tobacco smoke. He has never used smokeless tobacco. He reports that he does not drink alcohol and does not use drugs.    Allergies:  Allergies[1]    Home Medications:  Prescriptions Prior to Admission[2]    Physical Exam:   General: Alert, orientated x3.  Cooperative.  No apparent distress.  Vital Signs:  Height 74\", weight 224 lb (101.6 kg).  HEENT: Exam is unremarkable.  Pupils are equal and round.    Lungs: no labored breathing.  Cardiac: Regular rate and rhythm.  Abdomen:  Soft, non-distended  Extremities:  No lower extremity edema noted.    Skin: Normal texture and turgor.  Neurologic: No new musculoskeletal or focal neurodeficits.    Impression and Plan:  There is no problem list on file for this patient.      Plan for L5-S1 interlaminar epidural steroid injection fluoroscopic guidance and local anesthesia.    Patient follow-up with me in about 3 weeks.    Aashish Mcnally DO  3/7/2025  7:40 AM         [1] No Known Allergies  [2]   No medications prior to admission.

## 2025-03-07 NOTE — OPERATIVE REPORT
[facility]    LUMBAR INTERLAMINAR  NAME:  Sanket Sweeney    MR #:    [id number] :  1990     PHYSICIAN:  Aashish Mcnally DO        Operative Report    DATE OF PROCEDURE: 3/7/2025   PREOPERATIVE DIAGNOSES: Lumbar radiculopathy [M54.16]   POSTOPERATIVE DIAGNOSES:   Lumbar radiculopathy   PROCEDURES: L5-S1 interlaminar epidural steroid injection done under fluoroscopic guidance with contrast enhancement.   SURGEON: Aashish Mcnally DO   ANESTHESIA: Local   INDICATIONS: Low back and leg pain      OPERATIVE PROCEDURE:  Written consent was obtained from the patient.  The patient was brought into the operating room and placed in the prone position on the fluoroscopy table with pillow underneath the chest and shoulders.  The patient's skin was cleaned and draped in a normal sterile fashion.  Using AP fluoroscopy, all 5 lumbar vertebrae were identified.  The L5-S1 interlaminar space was identified.  Skin was anesthetized with 1% PF lidocaine without epinephrine.  Then, a 3-1/2 inch, 18-gauge Tuohy needle was inserted and directed towards the paracentral left L5-S1 interlaminar space.  Then using contralateral oblique fluoroscopy and loss-of-resistance technique, the epidural space was entered.  Then, Omnipaque-240 contrast was used to obtain a good epidurogram indicating correct needle placement.  Then, aspiration was performed.  No blood, fluid, or air was aspirated.  Then, the patient was injected with a 4 cc solution of 2 cc of 1% PF lidocaine without epinephrine, and 2 cc of 6 mg/cc of Celestone Soluspan.  Then, the needle was removed.  The patient's skin was cleaned.  A Band-Aid was applied.  The patient was transferred to the cart and into [facility].  The patient was given discharge instructions and will follow up in the clinic as scheduled.  Throughout the whole procedure, the patient's pulse oximetry and vital signs were monitored and they remained completely stable.  Also, throughout the whole  procedure, prior to injection of any medication, aspiration was performed.  No blood, fluid, or air was aspirated at anytime.        Aashish Mcnally DO  Interventional Spine and Sports Medicine Specialist   Physical Medicine and Rehabilitation  [place] [id number] [place] [id number]    [facility]  [address] [place] [address]

## 2025-03-07 NOTE — DISCHARGE SUMMARY
[facility]  Discharge Summary    Sanket Sweenye Patient Status:  Hospital Outpatient Surgery    1990 MRN [id number]   Location [facility] ENDOSCOPY PAIN CENTER Attending Aashish Mcnally DO   Hosp Day # 0 PCP Reggie Jo MD     Date of Admission: 3/7/2025    Date of Discharge: 3/7/2025    Admitting Diagnosis: Lumbar radiculopathy [M54.16]    Discharge Diagnosis: Lumbar radiculopathy      Reason for Admission: Low back and leg pain here for lumbar intervention    Physical Exam: Stable from admission    Procedures: Status post L5-S1 interlaminar epidural steroid injection with fluoroscopic guidance and local anesthesia per    Complications: None    Disposition: Home or Self Care    Discharge Condition: Good    Discharge Medications:   Current Discharge Medication List        CONTINUE these medications which have NOT CHANGED    Details   gabapentin 300 MG Oral Cap Take 1 capsule (300 mg total) by mouth every 8 (eight) hours. FOR NERVE PAIN.  Qty: 270 capsule, Refills: 5    Associated Diagnoses: Neurogenic claudication due to lumbar spinal stenosis      cyclobenzaprine 10 MG Oral Tab Take 0.5-1 tablets (5-10 mg total) by mouth 3 (three) times daily as needed for Muscle spasms. May cause sedation; no driving.  Qty: 90 tablet, Refills: 3    Associated Diagnoses: Neurogenic claudication due to lumbar spinal stenosis           STOP taking these medications       predniSONE 20 MG Oral Tab              Follow up Visits: Follow-up with me in 3 weeks    Aashish Mcnally DO  3/7/2025  9:39 AM

## 2025-05-19 ENCOUNTER — TELEPHONE (OUTPATIENT)
Dept: PHYSICAL THERAPY | Facility: HOSPITAL | Age: 35
End: 2025-05-19

## 2025-05-20 ENCOUNTER — OFFICE VISIT (OUTPATIENT)
Dept: PHYSICAL THERAPY | Facility: HOSPITAL | Age: 35
End: 2025-05-20
Attending: PHYSICAL MEDICINE & REHABILITATION
Payer: MEDICAID

## 2025-05-20 DIAGNOSIS — M54.16 LUMBAR RADICULOPATHY, CHRONIC: Primary | ICD-10-CM

## 2025-05-20 PROCEDURE — 97162 PT EVAL MOD COMPLEX 30 MIN: CPT | Performed by: PHYSICAL THERAPIST

## 2025-05-20 PROCEDURE — 97110 THERAPEUTIC EXERCISES: CPT | Performed by: PHYSICAL THERAPIST

## 2025-05-20 PROCEDURE — 97530 THERAPEUTIC ACTIVITIES: CPT | Performed by: PHYSICAL THERAPIST

## 2025-05-21 NOTE — PROGRESS NOTES
SPINE EVALUATION:     Diagnosis:   Lumbar radiculopathy, chronic (M54.16) Patient:  Sanket Sweeney (34 year old, male)        Referring Provider: Aashish Mcnally  Today's Date   5/20/2025    Precautions:  None   Date of Evaluation: 05/20/25  Next MD visit: No data recorded  Date of Surgery: NA     PATIENT SUMMARY   Summary of chief complaints: L LE pain to the ankle  History of current condition: Getting in his car last June. Had been sore the previous week.  States he was unable to get out of his car secondary to the pain.  Reports his back pain went away but his leg pain returned.  Saw his PCP 2-3 month later.  Had a injection 3 months ago with Dr. Mcnally.   Pain level: current 4 /10, at best 2 /10, at worst 10 /10  Description of symptoms: Reports he has pain all the time.  Reports he has pain to the ankle when he stands up.  Pain in sitting is the knees.  Pain wakes him up>  Had tingling/spasms previously.  Denies weakness. The longer he walks the worse his pain get.   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  Pt goals: decrease pain, return to his PLOF  Red flag signs/symptoms: Pt denies dizziness, drop attacks, dysphagia, dysarthria, diplopia; Pt denies changes in bowel/bladder function, saddle anesthesia; Pt denies pain that wakes in sleep, fever, recent trauma, history of CA, pain unchanged with movement/activity    Past medical history was reviewed with Sanket.  Significant findings include:    Imaging/Tests: see chart   Sanket  has a past medical history of Infectious  mononucleosis (2007).  He  has no past surgical history on file.    ASSESSMENT  Sanket presents to physical therapy evaluation with primary c/o L LE pain to the ankle. The results of the objective tests and measures show significnat loss of lumbar spine ROM in the sagittal plane with both flexion and extension, decreased L LE strength with slight foot drop. Functional deficits include but are not limited to difficutly wtih sitting, standing, BLT, difficutly sleeping, dificulty with transitional movements like sit to stand and supine to sit.. Signs and symptoms are consistent with diagnosis of Lumbar radiculopathy, chronic (M54.16). Pt and PT discussed evaluation findings, pathology, POC and HEP.  Pt voiced understanding and performs HEP correctly without reported pain. Skilled Physical Therapy is medically necessary to address the above impairments and reach functional goals.    OBJECTIVE:    Musculoskeletal:  Observation/Posture: forward head posture; anterior pelvic tilt   Accessory Motion:  NT   Palpation:   NT    Special tests:   Loss of lumbar lordosis, tennis shoes with average arch   (+) SLR, (+) Slump L, (+) cross over SLR L    ROM and Strength:  (* denotes performed with pain)  Trunk ROM MMT (-/5)     Flex limited 50%       Ext limited 75%      R L R L     Side bend limted 25% limited 25%         Rotation           ,   Myotome MMT   MMT (-/5)    R L   Shoulder Abd (C5)       Elbow Ext (C7)       Elbow Flex (C6)       Wrist Flex (C7)       Wrist Ext (C6)       Thumb Ext (C8)       Digit Flex (C8)       Interossei (T1)       Hip Flex (L2) 5 5   Knee Ext (L3) 5 5   Ankle DF (L4) 5 4   Ankle PF (S1) 5 4   Grt Toe Ext (L5) 5 3+       Flexibility:  LE Flexibility R L     Hip Flexor         Hamstrings mod restricted significantly restricted     ITB         Piriformis         Quads         Gastroc-soleus           Neurological:  Sensation: grossly intact to light touch BEAN UE/LE          Balance and Functional  Mobility:  Gait: pt ambulates on level ground with decreased foot clearance; decreased arm swing; drop foot.         Today's Treatment and Response:   Pt education was provided on exam findings, treatment diagnosis, treatment plan, expectations, and prognosis.  Today's Treatment       5/20/2025   Spine Treatment   Therapeutic Exercise - prone lying  - FAUSTINO  - PPU's  - L side glides in standing  - repeated flexion in sitting     Therapeutic Activity - education on centralization of symptoms  - education on precautions for therapy  - education on gym based program   Therapeutic Exercise Minutes 15   Therapeutic Activity Minutes 8   Evaluation Minutes 25   Total Time Of Timed Procedures 23   Total Time Of Service-Based Procedures 25   Total Treatment Time 48   HEP - repeated flexion in sitting        Patient was instructed in and issued a HEP for: - repeated flexion in sitting    Charges:  PT EVAL:  , Eval mod, TE1, TA1  In agreement with evaluation findings and clinical rationale, this evaluation involved MODERATE COMPLEXITY decision making due to 1-2 personal factors/comorbidities, 3 or more body structures involved/activity limitations, and evolving symptoms as documented in the evaluation.                                                                         PLAN OF CARE:    Goals: (to be met in 10 visits)    Not Met Progress Toward Partially Met Met   Pt will improve transversus abdominis recruitment to perform proper isometric contraction without requiring verbal or tactile cuing to promote advancement of therex. [] [] [] []   Pt will demonstrate good understanding of proper posture and body mechanics to decrease pain and improve spinal safety. [] [] [] []   Pt will improve lumbar spine AROM flexion to >hands to mid shin to allow increase ease with bending forward to don shoes. [] [] [] []   Pt will report improved symptom centralization and absence of radicular symptoms for 3 consecutive days to improve  function with ADLs. [] [] [] []   Pt will have decreased paraspinal mm tension to tolerate standing >60 minutes for work and home activities. [] [] [] []   Pt will demonstrate improved core strength to be able to perform lifting boxes with <0/10 pain. [] [] [] []   Pt will be independent and compliant with comprehensive HEP to maintain progress achieved in PT [] [] [] []          Frequency / Duration: Patient will be seen 1x/week or a total of 10  visits over a 90 day period. Treatment will include: Gait training; Manual Therapy; Neuromuscular Re-education; Self-Care Home Management; Therapeutic Activities; Therapeutic Exercise; Home Exercise Program instruction; Patient/Family Education    Education or treatment limitation: None   Rehab Potential: good     Oswestry Disability Index Score  Score: (Patient-Rptd) 58 % (5/20/2025  8:55 AM)      Patient/Family/Caregiver was advised of these findings, precautions, and treatment options and has agreed to actively participate in planning and for this course of care.    Thank you for your referral. Please co-sign or sign and return this letter via fax as soon as possible to [phone]. If you have any questions, please contact me at Dept: [phone]    Sincerely,  Electronically signed by therapist: Angela Travis, PT  Physician's certification required: Yes  I certify the need for these services furnished under this plan of treatment and while under my care.    X___________________________________________________ Date____________________    Certification From: 5/20/2025  To:8/18/2025

## 2025-05-30 ENCOUNTER — APPOINTMENT (OUTPATIENT)
Dept: PHYSICAL THERAPY | Facility: HOSPITAL | Age: 35
End: 2025-05-30
Attending: PHYSICAL MEDICINE & REHABILITATION
Payer: MEDICAID

## 2025-06-06 ENCOUNTER — OFFICE VISIT (OUTPATIENT)
Dept: PHYSICAL THERAPY | Facility: HOSPITAL | Age: 35
End: 2025-06-06
Attending: PHYSICAL MEDICINE & REHABILITATION
Payer: MEDICAID

## 2025-06-06 PROCEDURE — 97140 MANUAL THERAPY 1/> REGIONS: CPT | Performed by: PHYSICAL THERAPIST

## 2025-06-06 PROCEDURE — 97530 THERAPEUTIC ACTIVITIES: CPT | Performed by: PHYSICAL THERAPIST

## 2025-06-06 PROCEDURE — 97110 THERAPEUTIC EXERCISES: CPT | Performed by: PHYSICAL THERAPIST

## 2025-06-06 NOTE — PROGRESS NOTES
Patient: Sanket Sweeney (34 year old, male) Referring Provider:  Insurance:   Diagnosis: Lumbar radiculopathy, chronic (M54.16) Aashish Mcnally  [facility] MEDICAID   Date of Surgery: NA Next MD visit:  N/A   Precautions:  None No data recorded Referral Information:    Date of Evaluation: Req: 12, Auth: 12, Exp: 12/31/2025 05/20/25 POC Auth Visits:  10       Today's Date   6/6/2025    Subjective  Reports he is feeling a little better.  Reports he did his side glides at home.  States the pain is less intense and more tolerable.  States his is sore by the end of his work shift.       Pain: 3/10     Objective  toe out position with walking, decreased push off L LE            Assessment  No advese effects to treatment.  The pt continues to have some pain down the L LE.  The pt responsed best for flexion rotation mob L.  Shown how to do that at home.  Encouarged the patient going to the gym for sagittal plane activities.    Goals (to be met in 10 visits)      Not Met Progress Toward Partially Met Met   Pt will improve transversus abdominis recruitment to perform proper isometric contraction without requiring verbal or tactile cuing to promote advancement of therex. [] [] [] []   Pt will demonstrate good understanding of proper posture and body mechanics to decrease pain and improve spinal safety. [] [] [] []   Pt will improve lumbar spine AROM flexion to >hands to mid shin to allow increase ease with bending forward to don shoes. [] [] [] []   Pt will report improved symptom centralization and absence of radicular symptoms for 3 consecutive days to improve function with ADLs. [] [] [] []   Pt will have decreased paraspinal mm tension to tolerate standing >60 minutes for work and home activities. [] [] [] []   Pt will demonstrate improved core strength to be able to perform lifting boxes with <0/10 pain. [] [] [] []   Pt will be independent and compliant with comprehensive HEP to maintain progress achieved in PT [] [] [] []               Plan  Continue PT - add more neutral dynamic stretching.    Treatment Last 4 Visits  Treatment Day: 2       5/20/2025 6/6/2025   Spine Treatment   Therapeutic Exercise - prone lying  - FAUSTINO  - PPU's  - L side glides in standing  - repeated flexion in sitting   - calf raises in standing  - standing calf stretch  1.  Knee straight  2.  Knee bent   Manual Therapy  - flexion rotation manual stretch  - manual neutral dynamic stretching   Therapeutic Activity - education on centralization of symptoms  - education on precautions for therapy  - education on gym based program - self flexion/rotation mobilization  - education on walking problem  - education on gym based program     Therapeutic Exercise Minutes 15 15   Neuro Re-Educ Minutes  23   Therapeutic Activity Minutes 8 8   Evaluation Minutes 25    Total Time Of Timed Procedures 23 46   Total Time Of Service-Based Procedures 25 0   Total Treatment Time 48 46   HEP - repeated flexion in sitting - calf raises in standing  - standing calf stretch  1.  Knee straight  2.  Knee bent    - self flexion/rotation Mob        HEP  - calf raises in standing  - standing calf stretch  1.  Knee straight  2.  Knee bent    - self flexion/rotation Mob    Charges  TE1, Man2, TA1

## 2025-06-25 ENCOUNTER — OFFICE VISIT (OUTPATIENT)
Dept: PHYSICAL THERAPY | Facility: HOSPITAL | Age: 35
End: 2025-06-25
Attending: PHYSICAL MEDICINE & REHABILITATION
Payer: MEDICAID

## 2025-06-25 PROCEDURE — 97112 NEUROMUSCULAR REEDUCATION: CPT | Performed by: PHYSICAL THERAPIST

## 2025-06-25 PROCEDURE — 97140 MANUAL THERAPY 1/> REGIONS: CPT | Performed by: PHYSICAL THERAPIST

## 2025-06-25 NOTE — PROGRESS NOTES
Patient: Sanket Sweeney (34 year old, male) Referring Provider:  Insurance:   Diagnosis: Lumbar radiculopathy, chronic (M54.16) Aashish Mcnally  [facility] MEDICAID   Date of Surgery: NA Next MD visit:  N/A   Precautions:  None No data recorded Referral Information:    Date of Evaluation: Req: 12, Auth: 12, Exp: 12/31/2025 05/20/25 POC Auth Visits:  10       Today's Date   6/25/2025    Subjective  Reports he is getting better slowly.  States he has less spasms in his leg.  State he is worse still, better moving.  Sttes he can get through work more easily.  States he worked 4 days in a row and was very sore after that.       Pain: 2/10     Objective        Foot drop L     Assessment  No adverse effects to treatment.  Gets the most relief from the flexion/rotation mobilization.  Also added neutral flosing and L stance activities.    Goals (to be met in 10 visits)      Not Met Progress Toward Partially Met Met   Pt will improve transversus abdominis recruitment to perform proper isometric contraction without requiring verbal or tactile cuing to promote advancement of therex. [] [] [] []   Pt will demonstrate good understanding of proper posture and body mechanics to decrease pain and improve spinal safety. [] [] [] []   Pt will improve lumbar spine AROM flexion to >hands to mid shin to allow increase ease with bending forward to don shoes. [] [] [] []   Pt will report improved symptom centralization and absence of radicular symptoms for 3 consecutive days to improve function with ADLs. [] [] [] []   Pt will have decreased paraspinal mm tension to tolerate standing >60 minutes for work and home activities. [] [] [] []   Pt will demonstrate improved core strength to be able to perform lifting boxes with <0/10 pain. [] [] [] []   Pt will be independent and compliant with comprehensive HEP to maintain progress achieved in PT [] [] [] []                  Plan  Continue PT - add more neutral dynamic stretching.    Treatment Last 4  Visits  Treatment Day: 3       5/20/2025 6/6/2025 6/25/2025   Spine Treatment   Therapeutic Exercise - prone lying  - FAUSTINO  - PPU's  - L side glides in standing  - repeated flexion in sitting   - calf raises in standing  - standing calf stretch  1.  Knee straight  2.  Knee bent    Neuro Re-Education   - L stance position  - L stance position with R arm reach  - standing integration #74   Manual Therapy  - flexion rotation manual stretch  - manual neutral dynamic stretching   - flexion rotation manual stretch  - manual neutral dynamic stretching       Therapeutic Activity - education on centralization of symptoms  - education on precautions for therapy  - education on gym based program - self flexion/rotation mobilization  - education on walking problem  - education on gym based program      Therapeutic Exercise Minutes 15 15    Neuro Re-Educ Minutes  23 23   Manual Therapy Minutes   23   Therapeutic Activity Minutes 8 8    Evaluation Minutes 25     Total Time Of Timed Procedures 23 46 46   Total Time Of Service-Based Procedures 25 0 0   Total Treatment Time 48 46 46   HEP - repeated flexion in sitting - calf raises in standing  - standing calf stretch  1.  Knee straight  2.  Knee bent    - self flexion/rotation Mob Standing integration #74        HEP  Standing integration #74    Charges  Man2, NM2

## 2025-06-26 ENCOUNTER — APPOINTMENT (OUTPATIENT)
Dept: PHYSICAL THERAPY | Facility: HOSPITAL | Age: 35
End: 2025-06-26
Attending: PHYSICAL MEDICINE & REHABILITATION
Payer: MEDICAID

## 2025-07-03 ENCOUNTER — OFFICE VISIT (OUTPATIENT)
Dept: PHYSICAL THERAPY | Facility: HOSPITAL | Age: 35
End: 2025-07-03
Attending: PHYSICAL MEDICINE & REHABILITATION
Payer: MEDICAID

## 2025-07-03 PROCEDURE — 97530 THERAPEUTIC ACTIVITIES: CPT | Performed by: PHYSICAL THERAPIST

## 2025-07-03 PROCEDURE — 97112 NEUROMUSCULAR REEDUCATION: CPT | Performed by: PHYSICAL THERAPIST

## 2025-07-03 NOTE — PROGRESS NOTES
Patient: Sanket Sweeney (34 year old, male) Referring Provider:  Insurance:   Diagnosis: Lumbar radiculopathy, chronic (M54.16) Aashish Mcnally  [facility] [place]   Date of Surgery: NA Next MD visit:  N/A   Precautions:  None No data recorded Referral Information:    Date of Evaluation: Req: 12, Auth: 12, Exp: 12/31/2025 05/20/25 POC Auth Visits:  10       Today's Date   7/3/2025    Subjective  Reports he has been doing ok.  Reports he is less tight and sore.  Able to stand longer at work.  Has been doing his HEP.  Reports the standing on the R leg is his favorite exercise.       Pain: 2/10     Objective            Assessment  No adverse effects to treatment.  The pt was miky to progress to strengthening activites this date.  Updated HEP.    Goals (to be met in 10 visits)      Not Met Progress Toward Partially Met Met   Pt will improve transversus abdominis recruitment to perform proper isometric contraction without requiring verbal or tactile cuing to promote advancement of therex. [] [] [] []   Pt will demonstrate good understanding of proper posture and body mechanics to decrease pain and improve spinal safety. [] [] [] []   Pt will improve lumbar spine AROM flexion to >hands to mid shin to allow increase ease with bending forward to don shoes. [] [] [] []   Pt will report improved symptom centralization and absence of radicular symptoms for 3 consecutive days to improve function with ADLs. [] [] [] []   Pt will have decreased paraspinal mm tension to tolerate standing >60 minutes for work and home activities. [] [] [] []   Pt will demonstrate improved core strength to be able to perform lifting boxes with <0/10 pain. [] [] [] []   Pt will be independent and compliant with comprehensive HEP to maintain progress achieved in PT [] [] [] []                      Plan  Continue PT - add more neutral dynamic stretching.    Treatment Last 4 Visits  Treatment Day: 4       5/20/2025 6/6/2025 6/25/2025 7/3/2025   Spine Treatment    Therapeutic Exercise - prone lying  - FAUSTINO  - PPU's  - L side glides in standing  - repeated flexion in sitting   - calf raises in standing  - standing calf stretch  1.  Knee straight  2.  Knee bent     Neuro Re-Education   - L stance position  - L stance position with R arm reach  - standing integration #74 - standing integration #74  - modified all 4 belly lift  - R glut max into the wall  - PME with child pose   Manual Therapy  - flexion rotation manual stretch  - manual neutral dynamic stretching   - flexion rotation manual stretch  - manual neutral dynamic stretching     --   Therapeutic Activity - education on centralization of symptoms  - education on precautions for therapy  - education on gym based program - self flexion/rotation mobilization  - education on walking problem  - education on gym based program    - education on location of local pools and swimming/activities to do in the water   Therapeutic Exercise Minutes 15 15     Neuro Re-Educ Minutes  23 23 38   Manual Therapy Minutes   23    Therapeutic Activity Minutes 8 8  8   Evaluation Minutes 25      Total Time Of Timed Procedures 23 46 46 46   Total Time Of Service-Based Procedures 25 0 0 0   Total Treatment Time 48 46 46 46   HEP - repeated flexion in sitting - calf raises in standing  - standing calf stretch  1.  Knee straight  2.  Knee bent    - self flexion/rotation Mob Standing integration #74 - standing integration #74  - modified all 4 belly lift  - R glut max into the wall  - PME with child pose        HEP  - standing integration #74  - modified all 4 belly lift  - R glut max into the wall  - PME with child pose    Charges  NM3, TA1

## 2025-07-08 ENCOUNTER — OFFICE VISIT (OUTPATIENT)
Dept: PHYSICAL THERAPY | Facility: HOSPITAL | Age: 35
End: 2025-07-08
Attending: PHYSICAL MEDICINE & REHABILITATION
Payer: MEDICAID

## 2025-07-08 PROCEDURE — 97530 THERAPEUTIC ACTIVITIES: CPT | Performed by: PHYSICAL THERAPIST

## 2025-07-08 PROCEDURE — 97112 NEUROMUSCULAR REEDUCATION: CPT | Performed by: PHYSICAL THERAPIST

## 2025-07-08 NOTE — PROGRESS NOTES
Patient: Sanket Sweeney (34 year old, male) Referring Provider:  Insurance:   Diagnosis: Lumbar radiculopathy, chronic (M54.16) Aashish Mcnally  [facility] [place] MEDICAID   Date of Surgery: NA Next MD visit:  N/A   Precautions:  None No data recorded Referral Information:    Date of Evaluation: Req: 12, Auth: 12, Exp: 12/31/2025 05/20/25 POC Auth Visits:  10       Today's Date   7/8/2025    Subjective  Reports he is feeling better.  State he does his HEP regularly.  States he is tense in the morning and does his HEP then.  States he does feel the exercises give him releif.       Pain: 2/10     Objective  (-) heel walking and domenico walking         Assessment  No adverse effects to treatment.  The pt continues to progress and have less pain. Added retro activities on the stairs to further improve glut activaition.Added Red T band to his glut activation activities.    Goals (to be met in 10 visits)      Not Met Progress Toward Partially Met Met   Pt will improve transversus abdominis recruitment to perform proper isometric contraction without requiring verbal or tactile cuing to promote advancement of therex. [] [] [] []   Pt will demonstrate good understanding of proper posture and body mechanics to decrease pain and improve spinal safety. [] [] [] []   Pt will improve lumbar spine AROM flexion to >hands to mid shin to allow increase ease with bending forward to don shoes. [] [] [] []   Pt will report improved symptom centralization and absence of radicular symptoms for 3 consecutive days to improve function with ADLs. [] [] [] []   Pt will have decreased paraspinal mm tension to tolerate standing >60 minutes for work and home activities. [] [] [] []   Pt will demonstrate improved core strength to be able to perform lifting boxes with <0/10 pain. [] [] [] []   Pt will be independent and compliant with comprehensive HEP to maintain progress achieved in PT [] [] [] []                          Plan  Continue PT - add more stability  activities and endurance training    Treatment Last 4 Visits  Treatment Day: 5       6/6/2025 6/25/2025 7/3/2025 7/8/2025   Spine Treatment   Therapeutic Exercise - calf raises in standing  - standing calf stretch  1.  Knee straight  2.  Knee bent      Neuro Re-Education  - L stance position  - L stance position with R arm reach  - standing integration #74 - standing integration #74  - modified all 4 belly lift  - R glut max into the wall  - PME with child pose - retro stairs  - all 4 resisted R glut max  - standing resisted R glut max   Manual Therapy - flexion rotation manual stretch  - manual neutral dynamic stretching   - flexion rotation manual stretch  - manual neutral dynamic stretching     --    Therapeutic Activity - self flexion/rotation mobilization  - education on walking problem  - education on gym based program    - education on location of local pools and swimming/activities to do in the water - education on stairs for exercises   Therapeutic Exercise Minutes 15      Neuro Re-Educ Minutes 23 23 38 38   Manual Therapy Minutes  23     Therapeutic Activity Minutes 8  8 8   Total Time Of Timed Procedures 46 46 46 46   Total Time Of Service-Based Procedures 0 0 0 0   Total Treatment Time 46 46 46 46   HEP - calf raises in standing  - standing calf stretch  1.  Knee straight  2.  Knee bent    - self flexion/rotation Mob Standing integration #74 - standing integration #74  - modified all 4 belly lift  - R glut max into the wall  - PME with child pose - retro stairs  - all 4 resisted R glut max  - standing resisted R glut max        HEP  - retro stairs  - all 4 resisted R glut max  - standing resisted R glut max    Charges  NM3, TA1

## 2025-07-10 ENCOUNTER — OFFICE VISIT (OUTPATIENT)
Dept: PHYSICAL THERAPY | Facility: HOSPITAL | Age: 35
End: 2025-07-10
Attending: PHYSICAL MEDICINE & REHABILITATION
Payer: MEDICAID

## 2025-07-10 PROCEDURE — 97110 THERAPEUTIC EXERCISES: CPT | Performed by: PHYSICAL THERAPIST

## 2025-07-10 PROCEDURE — 97112 NEUROMUSCULAR REEDUCATION: CPT | Performed by: PHYSICAL THERAPIST

## 2025-07-10 NOTE — PROGRESS NOTES
Patient: Sanket Sweeney (34 year old, male) Referring Provider:  Insurance:   Diagnosis: Lumbar radiculopathy, chronic (M54.16) Aashish Mcnally  [facility] [place] MEDICAID   Date of Surgery: NA Next MD visit:  N/A   Precautions:  None No data recorded Referral Information:    Date of Evaluation: Req: 12, Auth: 12, Exp: 12/31/2025 05/20/25 POC Auth Visits:  10       Today's Date   7/10/2025    Subjective  Reports he is feeing good and feeling \"loose\" in his back.       Pain: 2/10     Objective               Assessment  No adverse effects to treatment.  Added more cardiovascular training this date.  The pt will continue his current HEP.    Goals (to be met in 10 visits)      Not Met Progress Toward Partially Met Met   Pt will improve transversus abdominis recruitment to perform proper isometric contraction without requiring verbal or tactile cuing to promote advancement of therex. [] [] [] []   Pt will demonstrate good understanding of proper posture and body mechanics to decrease pain and improve spinal safety. [] [] [] []   Pt will improve lumbar spine AROM flexion to >hands to mid shin to allow increase ease with bending forward to don shoes. [] [] [] []   Pt will report improved symptom centralization and absence of radicular symptoms for 3 consecutive days to improve function with ADLs. [] [] [] []   Pt will have decreased paraspinal mm tension to tolerate standing >60 minutes for work and home activities. [] [] [] []   Pt will demonstrate improved core strength to be able to perform lifting boxes with <0/10 pain. [] [] [] []   Pt will be independent and compliant with comprehensive HEP to maintain progress achieved in PT [] [] [] []                              Plan  continue PT - decrease frequency of visits secondary to less pain    Treatment Last 4 Visits  Treatment Day: 6       6/25/2025 7/3/2025 7/8/2025 7/10/2025   Spine Treatment   Therapeutic Exercise    - NuStep x 10 minutes with intervals     Neuro Re-Education - L  stance position  - L stance position with R arm reach  - standing integration #74 - standing integration #74  - modified all 4 belly lift  - R glut max into the wall  - PME with child pose - retro stairs  - all 4 resisted R glut max  - standing resisted R glut max     - wall planks  - retro stairs   - all 4 resisted R glut max   - standing resisted R glut max     - review of HEP   Manual Therapy   - flexion rotation manual stretch  - manual neutral dynamic stretching     --     Therapeutic Activity  - education on location of local pools and swimming/activities to do in the water - education on stairs for exercises  -sit to stand to exhalation    Neuro Re-Educ Minutes 23 38 38    Manual Therapy Minutes 23      Therapeutic Activity Minutes  8 8    Total Time Of Timed Procedures 46 46 46 0   Total Time Of Service-Based Procedures 0 0 0 0   Total Treatment Time 46 46 46 0   HEP Standing integration #74 - standing integration #74  - modified all 4 belly lift  - R glut max into the wall  - PME with child pose - retro stairs  - all 4 resisted R glut max  - standing resisted R glut max Continue currently HEP        HEP  Continue currently HEP    Charges  TE1, NM3

## (undated) DEVICE — PAIN TRAY: Brand: MEDLINE INDUSTRIES, INC.

## (undated) DEVICE — SKIN REG/FINE DUAL MARKER, RULER, LABELS: Brand: MEDLINE

## (undated) DEVICE — GLOVE SUR 7.5 SENSICARE PIP WHT PWD F

## (undated) DEVICE — REMOVER LOT 4OZ N IRRIG UNSCNT SFT MOIST LIQ

## (undated) DEVICE — EXTENSION SET, MALE LUER LOCK ADAPTER

## (undated) DEVICE — SYRINGE 10ML SLIP TIP LOSS OF RESIST PLAS

## (undated) DEVICE — AVANOS* TUOHY EPIDURAL NEEDLE: Brand: AVANOS

## (undated) DEVICE — GLOVE SUR 6.5 SENSICARE PIP WHT PWD F

## (undated) DEVICE — BANDAGE ADH 1INX3IN NAT FAB N ADH PD CURAD

## (undated) DEVICE — GLOVE,SURG,SENSICARE,ALOE,LF,PF,7: Brand: MEDLINE

## (undated) NOTE — LETTER
Date & Time: 9/27/2018, 8:11 AM  Patient: Elizabeth Fall  Encounter Provider(s):    Donny Hancock MD       To Whom It May Concern:    Harriet Quiroz was seen and treated in our department on 9/27/2018. He should not return to work until 10/4/18.     If y

## (undated) NOTE — ED AVS SNAPSHOT
Mr. Mikki Leavitt   MRN: [id number]    Department:  [facility] Emergency Department   Date of Visit:  9/27/2018           Disclosure     Insurance plans vary and the physician(s) referred by the ER may not be covered by your plan.  Please contac CARE PHYSICIAN AT ONCE OR RETURN IMMEDIATELY TO THE EMERGENCY DEPARTMENT. If you have been prescribed any medication(s), please fill your prescription right away and begin taking the medication(s) as directed.   If you believe that any of the medications